# Patient Record
Sex: FEMALE | Race: WHITE | NOT HISPANIC OR LATINO
[De-identification: names, ages, dates, MRNs, and addresses within clinical notes are randomized per-mention and may not be internally consistent; named-entity substitution may affect disease eponyms.]

---

## 2019-04-17 PROBLEM — Z00.00 ENCOUNTER FOR PREVENTIVE HEALTH EXAMINATION: Status: ACTIVE | Noted: 2019-04-17

## 2020-01-23 ENCOUNTER — APPOINTMENT (OUTPATIENT)
Dept: NEUROLOGY | Facility: CLINIC | Age: 80
End: 2020-01-23
Payer: MEDICARE

## 2020-01-23 VITALS
BODY MASS INDEX: 23.55 KG/M2 | HEART RATE: 88 BPM | OXYGEN SATURATION: 98 % | SYSTOLIC BLOOD PRESSURE: 150 MMHG | DIASTOLIC BLOOD PRESSURE: 83 MMHG | TEMPERATURE: 97.7 F | HEIGHT: 62 IN | WEIGHT: 128 LBS

## 2020-01-23 PROCEDURE — 99213 OFFICE O/P EST LOW 20 MIN: CPT

## 2020-01-23 NOTE — PHYSICAL EXAM
[FreeTextEntry1] : Patient has fluent speech. Follows commands.  Looks to  frequently for answers.Impaired short term recall. Symmetric facial expression. Ambulates w/o walking aid.\par

## 2020-01-23 NOTE — ASSESSMENT
[FreeTextEntry1] : Alzheimer disease, moderate sseverity. Still independent in ADL's though requires some prompting from her spouse for dressing.\par \par 1.  MRI brain w/wo contrast due to hx of breast cancer.\par 2.  Donepezil 5 mg po daily with breakfast.\par 3.  Continue memantine 5 mg po bid.\par 4.  Return in 6 months.\par 5.  Encouraged Alzheimer support group.\par 6.  Physical activity and socialization encouraged.

## 2020-01-23 NOTE — HISTORY OF PRESENT ILLNESS
[FreeTextEntry1] : Pt is 80 yo female with dementia, likely Alzheimer's.   states she is slow to respond to get dressed.  Repeats questions more frequently.  Asked  every half hour what they were going to have for dinner.  \par \par  checked on brain scan and states she never had one before.  She had chest, abdomen and pelvis.  Had breast cancer in past and mastectomy.  \par \par SHe does not drive.\par Still cooks.  Discussed home safety.

## 2020-05-11 ENCOUNTER — RX RENEWAL (OUTPATIENT)
Age: 80
End: 2020-05-11

## 2020-07-28 RX ORDER — DONEPEZIL HYDROCHLORIDE 5 MG/1
5 TABLET ORAL
Qty: 90 | Refills: 3 | Status: ACTIVE | COMMUNITY
Start: 2020-01-23

## 2020-07-30 ENCOUNTER — APPOINTMENT (OUTPATIENT)
Dept: NEUROLOGY | Facility: CLINIC | Age: 80
End: 2020-07-30

## 2020-10-06 ENCOUNTER — APPOINTMENT (OUTPATIENT)
Dept: NEUROLOGY | Facility: CLINIC | Age: 80
End: 2020-10-06
Payer: MEDICARE

## 2020-10-06 VITALS
WEIGHT: 128 LBS | OXYGEN SATURATION: 97 % | TEMPERATURE: 97 F | HEIGHT: 62 IN | DIASTOLIC BLOOD PRESSURE: 84 MMHG | SYSTOLIC BLOOD PRESSURE: 150 MMHG | BODY MASS INDEX: 23.55 KG/M2 | HEART RATE: 73 BPM

## 2020-10-06 DIAGNOSIS — F03.90 UNSPECIFIED DEMENTIA W/OUT BEHAVIORAL DISTURBANCE: ICD-10-CM

## 2020-10-06 PROCEDURE — 99214 OFFICE O/P EST MOD 30 MIN: CPT

## 2020-10-06 RX ORDER — MEMANTINE HYDROCHLORIDE 5 MG/1
5 TABLET, FILM COATED ORAL
Qty: 180 | Refills: 3 | Status: ACTIVE | COMMUNITY
Start: 2020-01-23 | End: 1900-01-01

## 2020-10-06 NOTE — ASSESSMENT
[FreeTextEntry1] : 1.  Dementia, most likely Alzheimer type, progressed.  She is on maximal medical therapy since unlikely to tolerate higher donepezil dose due to some loose stools on the 5 mg/day dose. Cardiologist apparently did not want her on higher memantine dose than 5 mg bid. \par 2.  BP is mildly elevated today though pt admits to being anxious in doctor's office.\par 3.  Check B12/folate\par 4.  Return in 6 months.\par

## 2020-10-06 NOTE — HISTORY OF PRESENT ILLNESS
[FreeTextEntry1] : Pt presents for f/u of AD.  First 56 years of marriage no problems, no yelling.\par Now short term memory gone, depression and uptight and worries and yells at him. \par Long term memory is pretty good.\par \par Struggles with cooking, hard to remember how she did certain recipes.  \par No safety concerns.  \par  says she doesn't hit him.\par \par She remains independent in her ADL's.\par \par She is able to sleep through the night and doesn't get up most of the time before 11:30 or 12 oclock.\par \par  states she was tested for sleep apnea as was he and both have it.  She wasn't able to cope with the mask.

## 2021-04-05 ENCOUNTER — APPOINTMENT (OUTPATIENT)
Dept: NEUROLOGY | Facility: CLINIC | Age: 81
End: 2021-04-05

## 2021-09-13 NOTE — PHYSICAL EXAM
[FreeTextEntry1] : Able to name president but apparently not aware of his recent Covid infection.\par able to tell me month and year.\par Paucity of speech, asks examiner to repeat himself frequently.\par Has some apraxia for brushing hair.\par strength 5/5 upper and lower extremities.\par ambulates w/o difficulty.\par LT, PP inact. Some mild decrement in vibration sense in LE's.\par  decrease appetite/dehydration

## 2021-12-14 ENCOUNTER — APPOINTMENT (OUTPATIENT)
Dept: NEUROLOGY | Facility: CLINIC | Age: 81
End: 2021-12-14

## 2022-07-08 ENCOUNTER — INPATIENT (INPATIENT)
Facility: HOSPITAL | Age: 82
LOS: 4 days | Discharge: NURSING, REHAB OR LONG-TERM CARE | End: 2022-07-13
Attending: INTERNAL MEDICINE | Admitting: INTERNAL MEDICINE

## 2022-07-08 VITALS
HEIGHT: 62 IN | HEART RATE: 50 BPM | WEIGHT: 115.08 LBS | OXYGEN SATURATION: 100 % | DIASTOLIC BLOOD PRESSURE: 78 MMHG | TEMPERATURE: 98 F | SYSTOLIC BLOOD PRESSURE: 123 MMHG | RESPIRATION RATE: 17 BRPM

## 2022-07-08 DIAGNOSIS — Z90.12 ACQUIRED ABSENCE OF LEFT BREAST AND NIPPLE: Chronic | ICD-10-CM

## 2022-07-08 DIAGNOSIS — Z90.710 ACQUIRED ABSENCE OF BOTH CERVIX AND UTERUS: Chronic | ICD-10-CM

## 2022-07-08 LAB
ALBUMIN SERPL ELPH-MCNC: 4.5 G/DL — SIGNIFICANT CHANGE UP (ref 3.5–5.2)
ALP SERPL-CCNC: 70 U/L — SIGNIFICANT CHANGE UP (ref 30–115)
ALT FLD-CCNC: 11 U/L — SIGNIFICANT CHANGE UP (ref 0–41)
ANION GAP SERPL CALC-SCNC: 14 MMOL/L — SIGNIFICANT CHANGE UP (ref 7–14)
APPEARANCE UR: CLEAR — SIGNIFICANT CHANGE UP
APTT BLD: 29.1 SEC — SIGNIFICANT CHANGE UP (ref 27–39.2)
AST SERPL-CCNC: 22 U/L — SIGNIFICANT CHANGE UP (ref 0–41)
BASOPHILS # BLD AUTO: 0.03 K/UL — SIGNIFICANT CHANGE UP (ref 0–0.2)
BASOPHILS NFR BLD AUTO: 0.2 % — SIGNIFICANT CHANGE UP (ref 0–1)
BILIRUB SERPL-MCNC: 0.7 MG/DL — SIGNIFICANT CHANGE UP (ref 0.2–1.2)
BILIRUB UR-MCNC: NEGATIVE — SIGNIFICANT CHANGE UP
BLD GP AB SCN SERPL QL: SIGNIFICANT CHANGE UP
BUN SERPL-MCNC: 18 MG/DL — SIGNIFICANT CHANGE UP (ref 10–20)
CALCIUM SERPL-MCNC: 10.2 MG/DL — HIGH (ref 8.5–10.1)
CHLORIDE SERPL-SCNC: 104 MMOL/L — SIGNIFICANT CHANGE UP (ref 98–110)
CK SERPL-CCNC: 41 U/L — SIGNIFICANT CHANGE UP (ref 0–225)
CO2 SERPL-SCNC: 25 MMOL/L — SIGNIFICANT CHANGE UP (ref 17–32)
COLOR SPEC: YELLOW — SIGNIFICANT CHANGE UP
CREAT SERPL-MCNC: 1.1 MG/DL — SIGNIFICANT CHANGE UP (ref 0.7–1.5)
DIFF PNL FLD: NEGATIVE — SIGNIFICANT CHANGE UP
EGFR: 50 ML/MIN/1.73M2 — LOW
EOSINOPHIL # BLD AUTO: 0.06 K/UL — SIGNIFICANT CHANGE UP (ref 0–0.7)
EOSINOPHIL NFR BLD AUTO: 0.5 % — SIGNIFICANT CHANGE UP (ref 0–8)
ETHANOL SERPL-MCNC: <10 MG/DL — SIGNIFICANT CHANGE UP
GLUCOSE SERPL-MCNC: 156 MG/DL — HIGH (ref 70–99)
GLUCOSE UR QL: NEGATIVE — SIGNIFICANT CHANGE UP
HCT VFR BLD CALC: 38.2 % — SIGNIFICANT CHANGE UP (ref 37–47)
HGB BLD-MCNC: 13.4 G/DL — SIGNIFICANT CHANGE UP (ref 12–16)
IMM GRANULOCYTES NFR BLD AUTO: 0.6 % — HIGH (ref 0.1–0.3)
INR BLD: 1.06 RATIO — SIGNIFICANT CHANGE UP (ref 0.65–1.3)
KETONES UR-MCNC: NEGATIVE — SIGNIFICANT CHANGE UP
LACTATE SERPL-SCNC: 4 MMOL/L — CRITICAL HIGH (ref 0.7–2)
LEUKOCYTE ESTERASE UR-ACNC: NEGATIVE — SIGNIFICANT CHANGE UP
LIDOCAIN IGE QN: 39 U/L — SIGNIFICANT CHANGE UP (ref 7–60)
LYMPHOCYTES # BLD AUTO: 1.22 K/UL — SIGNIFICANT CHANGE UP (ref 1.2–3.4)
LYMPHOCYTES # BLD AUTO: 9.4 % — LOW (ref 20.5–51.1)
MCHC RBC-ENTMCNC: 31.2 PG — HIGH (ref 27–31)
MCHC RBC-ENTMCNC: 35.1 G/DL — SIGNIFICANT CHANGE UP (ref 32–37)
MCV RBC AUTO: 88.8 FL — SIGNIFICANT CHANGE UP (ref 81–99)
MONOCYTES # BLD AUTO: 0.59 K/UL — SIGNIFICANT CHANGE UP (ref 0.1–0.6)
MONOCYTES NFR BLD AUTO: 4.6 % — SIGNIFICANT CHANGE UP (ref 1.7–9.3)
NEUTROPHILS # BLD AUTO: 10.96 K/UL — HIGH (ref 1.4–6.5)
NEUTROPHILS NFR BLD AUTO: 84.7 % — HIGH (ref 42.2–75.2)
NITRITE UR-MCNC: NEGATIVE — SIGNIFICANT CHANGE UP
NRBC # BLD: 0 /100 WBCS — SIGNIFICANT CHANGE UP (ref 0–0)
PH UR: 7 — SIGNIFICANT CHANGE UP (ref 5–8)
PLATELET # BLD AUTO: 141 K/UL — SIGNIFICANT CHANGE UP (ref 130–400)
POTASSIUM SERPL-MCNC: 4.5 MMOL/L — SIGNIFICANT CHANGE UP (ref 3.5–5)
POTASSIUM SERPL-SCNC: 4.5 MMOL/L — SIGNIFICANT CHANGE UP (ref 3.5–5)
PROT SERPL-MCNC: 6.6 G/DL — SIGNIFICANT CHANGE UP (ref 6–8)
PROT UR-MCNC: NEGATIVE — SIGNIFICANT CHANGE UP
PROTHROM AB SERPL-ACNC: 12.2 SEC — SIGNIFICANT CHANGE UP (ref 9.95–12.87)
RBC # BLD: 4.3 M/UL — SIGNIFICANT CHANGE UP (ref 4.2–5.4)
RBC # FLD: 13.3 % — SIGNIFICANT CHANGE UP (ref 11.5–14.5)
SARS-COV-2 RNA SPEC QL NAA+PROBE: SIGNIFICANT CHANGE UP
SODIUM SERPL-SCNC: 143 MMOL/L — SIGNIFICANT CHANGE UP (ref 135–146)
SP GR SPEC: 1.01 — SIGNIFICANT CHANGE UP (ref 1.01–1.03)
TROPONIN T SERPL-MCNC: <0.01 NG/ML — SIGNIFICANT CHANGE UP
UROBILINOGEN FLD QL: SIGNIFICANT CHANGE UP
WBC # BLD: 12.94 K/UL — HIGH (ref 4.8–10.8)
WBC # FLD AUTO: 12.94 K/UL — HIGH (ref 4.8–10.8)

## 2022-07-08 PROCEDURE — 71045 X-RAY EXAM CHEST 1 VIEW: CPT | Mod: 26

## 2022-07-08 PROCEDURE — 99223 1ST HOSP IP/OBS HIGH 75: CPT

## 2022-07-08 PROCEDURE — 93010 ELECTROCARDIOGRAM REPORT: CPT | Mod: 76

## 2022-07-08 PROCEDURE — 72125 CT NECK SPINE W/O DYE: CPT | Mod: 26,MA

## 2022-07-08 PROCEDURE — 64447 NJX AA&/STRD FEMORAL NRV IMG: CPT | Mod: GC

## 2022-07-08 PROCEDURE — 73502 X-RAY EXAM HIP UNI 2-3 VIEWS: CPT | Mod: 26,RT

## 2022-07-08 PROCEDURE — 93308 TTE F-UP OR LMTD: CPT | Mod: 26,GC

## 2022-07-08 PROCEDURE — 99283 EMERGENCY DEPT VISIT LOW MDM: CPT

## 2022-07-08 PROCEDURE — 76705 ECHO EXAM OF ABDOMEN: CPT | Mod: 26,GC

## 2022-07-08 PROCEDURE — 71260 CT THORAX DX C+: CPT | Mod: 26,MA

## 2022-07-08 PROCEDURE — 76604 US EXAM CHEST: CPT | Mod: 26,GC

## 2022-07-08 PROCEDURE — 73700 CT LOWER EXTREMITY W/O DYE: CPT | Mod: 26,RT,MA

## 2022-07-08 PROCEDURE — 76942 ECHO GUIDE FOR BIOPSY: CPT | Mod: 26,GC,59

## 2022-07-08 PROCEDURE — 74177 CT ABD & PELVIS W/CONTRAST: CPT | Mod: 26,MA

## 2022-07-08 PROCEDURE — 99285 EMERGENCY DEPT VISIT HI MDM: CPT | Mod: 25,GC

## 2022-07-08 PROCEDURE — 70450 CT HEAD/BRAIN W/O DYE: CPT | Mod: 26,MA

## 2022-07-08 RX ORDER — SODIUM CHLORIDE 9 MG/ML
1000 INJECTION, SOLUTION INTRAVENOUS
Refills: 0 | Status: DISCONTINUED | OUTPATIENT
Start: 2022-07-08 | End: 2022-07-09

## 2022-07-08 RX ORDER — SODIUM CHLORIDE 9 MG/ML
1000 INJECTION, SOLUTION INTRAVENOUS ONCE
Refills: 0 | Status: COMPLETED | OUTPATIENT
Start: 2022-07-08 | End: 2022-07-08

## 2022-07-08 RX ORDER — PANTOPRAZOLE SODIUM 20 MG/1
40 TABLET, DELAYED RELEASE ORAL
Refills: 0 | Status: DISCONTINUED | OUTPATIENT
Start: 2022-07-08 | End: 2022-07-09

## 2022-07-08 RX ORDER — ENOXAPARIN SODIUM 100 MG/ML
40 INJECTION SUBCUTANEOUS EVERY 24 HOURS
Refills: 0 | Status: DISCONTINUED | OUTPATIENT
Start: 2022-07-08 | End: 2022-07-09

## 2022-07-08 RX ADMIN — SODIUM CHLORIDE 1000 MILLILITER(S): 9 INJECTION, SOLUTION INTRAVENOUS at 12:00

## 2022-07-08 RX ADMIN — ENOXAPARIN SODIUM 40 MILLIGRAM(S): 100 INJECTION SUBCUTANEOUS at 18:00

## 2022-07-08 RX ADMIN — SODIUM CHLORIDE 75 MILLILITER(S): 9 INJECTION, SOLUTION INTRAVENOUS at 18:00

## 2022-07-08 RX ADMIN — SODIUM CHLORIDE 1000 MILLILITER(S): 9 INJECTION, SOLUTION INTRAVENOUS at 10:45

## 2022-07-08 NOTE — ED PROVIDER NOTE - CLINICAL SUMMARY MEDICAL DECISION MAKING FREE TEXT BOX
82 y F BIBEMS, found down, ams w/ stool and urine on BLE. Pt was found down outside, RLE shortened and ER. EKG, labs and imaging reviewed.  Patient with a right-sided intertrochanteric hip fracture.  Fascia iliac a block was performed, orthopedics evaluated the patient and plan to admit to medicine.  Pain controlled.

## 2022-07-08 NOTE — ED PROCEDURE NOTE - NS ED PROC PERFORMED BY1 FT
[facility]

## 2022-07-08 NOTE — H&P ADULT - NSCORESITESY/N_GEN_A_CORE_RD
No
Constitutional: in no apparent distress, speaking in full sentences  HEENT: neck supple, FROM, tongue is pink, moist and midline  EYES: non-injected, non-icteric, PERRL, EOMI  RESPIRATORY: lungs clear  CARDIAC: S1 S2, regular rate, moving chest wall symmetrically, DP pulse intact  GI: bowel sounds present, abdomen soft, non-tender  : no CVA tenderness  MSK: spine is midline, no calf tenderness, no knee pain, knee joint intact without laxity, tenderness to medial and lateral malleolus, most swelling to lateral malleolus, no tenderness over proximal metatarsal  SKIN: no jaundice, well healed L temporal scar  NEURO: awake and alert

## 2022-07-08 NOTE — CONSULT NOTE ADULT - ASSESSMENT
ASSESSMENT:  This is a 82y Female with PMH as above, presenting as a Trauma Alert, GCS 14, AAOx2, BOB, with complaint of right thigh pain , external signs of trauma include right hip tenderness.    Injuries:  -   -   -     PLAN:   Trauma Labs: CBC, CMP, PT/PTT/INR, EtOH  Trauma Imaging to include the following:  - CXR  - PXR  - CTH    - CT C-spine  - CT Chest  - CT Ab/Pelvis  - Extremity films: Right hip/femur    Additional studies:  EKG  Utox  UA    Additional consultations: ***pending  Neurosurgery  Orthopedics  OMFS  PT/Rehab/  Hospitalist/Medicine     Disposition pending results of above labs and imaging  Above plan discussed with Trauma attending,  ***  , patient, patient family, and ED team  --------------------------------------------------------------------------------------  07-08-22 @ 11:06 ASSESSMENT:  This is a 82y Female with PMH as above, presenting as a Trauma Alert, GCS 14, AAOx2, BOB, with complaint of right thigh pain , external signs of trauma include right hip tenderness.    Injuries:  - Acute Comminuted Intertrochanteric fracture of right hip      PLAN:   Trauma Labs: CBC, CMP, PT/PTT/INR, EtOH  Trauma Imaging to include the following:  - CXR  - PXR  - CTH    - CT C-spine  - CT Chest  - CT Ab/Pelvis  - Extremity films: Right hip/femur    Additional studies:  EKG  Utox  UA    Additional consultations:  Orthopedics  PT/Rehab/SW  Hospitalist/Medicine     Recommend admission to medicine given isolated hip fracture and plan for orthopedic intervention. Trauma Surgery will follow for tertiary survey on 7/9  Above plan discussed with Trauma attending, Dr. Boogie , patient, patient family, and ED team  --------------------------------------------------------------------------------------  07-08-22 @ 11:06 Pt aggressive and impatient despite explanation

## 2022-07-08 NOTE — H&P ADULT - ASSESSMENT
Patient is an 82-yo female with a pmh of dementia (AAO x1 baseline), GERD, breast cancer s/p mastectomy in remission, who was bibems after being found down altered with right hip pain. Patient scheduled for repair of hip fracture with Ortho Saturday, being admitted to medicine for management.    # R' Hip fracture  - scheduled for OR Saturday, 7/9/22  - ortho recs appr: bedrest, mechanical ppx, cardiac risk assessment by Dr. Banks (follows pt)  - f/u CBC/BMP, coags    #AMS on admission  # Syncope vs. Seizure vs. Mechanical Fall  # hx of dementia  - trauma imaging showed no acute intracranial pathology  - wbc 12.94 w/ lactate 4.0 on presentation s/p bolus in ED  - currently vitally stable  - gentle hydration  - f/u orthostatics, echo, trend lactate Patient is an 82-yo female with a pmh of dementia (AAO x1 baseline), GERD, breast cancer s/p mastectomy in remission, who was bibems after being found down altered with right hip pain. Patient scheduled for repair of hip fracture with Ortho Saturday, being admitted to medicine for management.    # R' Hip fracture  - scheduled for OR Saturday, 7/9/22  - ortho recs appr: bedrest, mechanical ppx, cardiac risk assessment by Dr. Banks (follows pt)  - f/u CBC/BMP, coags    #AMS on admission  # Syncope vs. Seizure vs. Mechanical Fall  # hx of dementia  - trauma imaging showed no acute intracranial pathology  - wbc 12.94 w/ lactate 4.0 on presentation s/p bolus in ED  - currently vitally stable  - gentle hydration  - f/u orthostatics, echo, trend lactate  - c/w with home medications: donepezil    # GERD  - Protonix 40 QD    Misc:  - DVT ppx: Lovenox   - GI ppx: Protonix  - Diet: NPO am  - Dispo: Acute, admit to medicine

## 2022-07-08 NOTE — ED PROCEDURE NOTE - PROCEDURE NAME, MLM
Point of Care Ultrasound FAST
Point of Care Ultrasound Guided Regional Nerve Block
Point of Care Ultrasound Pneumothorax

## 2022-07-08 NOTE — ED PROVIDER NOTE - OBJECTIVE STATEMENT
82 y F BIBEMS, found down, ams w/ stool and urine on BLE. Pt was found down outside, EMS was called and picked pt up; entire ride here pt was vitally stable, no meds given. 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 no reported allergies. pt was last seen by  ~9am; he went to the bathroom and when he came out, pt was not in the bed.  searched the home but did not find pt. denies recent illness/n/v/f/sob/cp/back pain.

## 2022-07-08 NOTE — ED PROVIDER NOTE - ATTENDING CONTRIBUTION TO CARE
82-year-old female here for evaluation of fall.  Patient poor story and was found outside and EMS called.  Patient reportedly fell and her on her right side.  Here patient no distress shivering intermittently S1-S2 bradycardia clear to auscultation bilaterally soft nontender right leg is shortened tenderness pain to the right hip with palpation.  Full range of motion of the upper extremities no pain with compression of ribs.  Based off the fact the patient is slightly altered with fall trauma alert called with plan for pan scan labs and reeval.

## 2022-07-08 NOTE — H&P ADULT - ATTENDING COMMENTS
Patient is a poor historian due ot her cognitive impairment    # Fall/Syncope r/o arrythmia  # Bradycardia  # Fracture R Hip due ot fall  # Acute pain due ot fracture  # Suspect reactive leukocytosis and hyperglycemia  # Dementia  # GERD    Telemetry monitoring  Fall precaution  FU echo  Cardiology evaluation prior to ORIF  Ortho recommendation noted  Pain control  Monitor labs and vitals

## 2022-07-08 NOTE — CONSULT NOTE ADULT - SUBJECTIVE AND OBJECTIVE BOX
GENERAL SURGERY CONSULT NOTE      TRAUMA SERVICE CONSULT NOTE  --------------------------------------------------------------------------------------------    TRAUMA ACTIVATION LEVEL: Alert    MECHANISM OF INJURY:      [] Blunt	[] MVC	[x] Fall	[] Pedestrian Struck	[] Motorcycle accident         [] Penetrating	[] Gun Shot Wound 		[] Stab Wound    GCS: 14 	E: 4	V: 4	M: 6    Patient is a 82y old  Female who presents after being found down and brought in by EMS. Patient presented covered in feces and is complaining of right thigh pain. She states she lives at [place] with her  but is also confused.     Primary Survey:   A - airway intact  B - bilateral breath sounds and good chest rise  C - initial BP  BP: 123/78 (07-08-22 @ 10:44), HR: 50 (07-08-22 @ 10:44) , palpable pulses in all extremities  D - GCS 14 on arrival  Exposure obtained      General: NAD  HEENT: Normocephalic, atraumatic, EOMI, PEERLA.  Neck: Soft, midline trachea.  Chest: No chest wall tenderness.   Cardiac: S1, S2, afib on monitor  Respiratory: Bilateral breath sounds, clear and equal bilaterally  Abdomen: Soft, non-distended, non-tender, no rebound, no guarding, no masses palpated  Groin: Normal appearing  Ext: palp radial b/l UE, b/l DP palp in Lower Extrem, motor and sensory grossly intact in all 4 extremities  Back: no TTP, no palpable runoff/stepoff/deformity  Rectal: No zay blood, DESIREE with good tone    Patient denies fevers/chills, denies lightheadedness/dizziness, denies SOB/chest pain, denies nausea/vomiting, denies constipation/diarrhea.  ***    ROS: 10-system review is otherwise negative except HPI above.      PAST MEDICAL & SURGICAL HISTORY:    FAMILY HISTORY:    [] Family history not pertinent as reviewed with the patient and family    SOCIAL HISTORY:     ALLERGIES: No Known Allergies      HOME MEDICATIONS:     CURRENT MEDICATIONS  MEDICATIONS (STANDING):   MEDICATIONS (PRN):  --------------------------------------------------------------------------------------------    Vitals:   T(C): 36.9 (07-08-22 @ 10:44), Max: 36.9 (07-08-22 @ 10:44)  HR: 50 (07-08-22 @ 10:44) (50 - 50)  BP: 123/78 (07-08-22 @ 10:44) (123/78 - 123/78)  RR: 17 (07-08-22 @ 10:44) (17 - 17)  SpO2: 100% (07-08-22 @ 10:44) (100% - 100%)  CAPILLARY BLOOD GLUCOSE        CAPILLARY BLOOD GLUCOSE          Height (cm): 157.5 (07-08 @ 10:44)  Weight (kg): 52.2 (07-08 @ 10:44)  BMI (kg/m2): 21 (07-08 @ 10:44)  BSA (m2): 1.51 (07-08 @ 10:44)        --------------------------------------------------------------------------------------------    LABS  CBC (07-08 @ 10:44)                              13.4                           12.94<H>  )----------------(  141        84.7<H>% Neutrophils, 9.4<L>% Lymphocytes, ANC: 10.96<H>                              38.2          Coags (07-08 @ 10:44)  aPTT 29.1 / INR 1.06 / PT 12.20          --------------------------------------------------------------------------------------------    MICROBIOLOGY      --------------------------------------------------------------------------------------------    IMAGING  ***    --------------------------------------------------------------------------------------------     GENERAL SURGERY CONSULT NOTE      TRAUMA SERVICE CONSULT NOTE  --------------------------------------------------------------------------------------------    TRAUMA ACTIVATION LEVEL: Alert    MECHANISM OF INJURY:      [] Blunt	[] MVC	[x] Fall	[] Pedestrian Struck	[] Motorcycle accident         [] Penetrating	[] Gun Shot Wound 		[] Stab Wound    GCS: 14 	E: 4	V: 4	M: 6    Patient is a 82y old  Female who presents after being found down and brought in by EMS. Patient presented covered in feces and is complaining of right thigh pain. She states she lives at [place] with her  but is also confused.     Primary Survey:   A - airway intact  B - bilateral breath sounds and good chest rise  C - initial BP  BP: 123/78 (07-08-22 @ 10:44), HR: 50 (07-08-22 @ 10:44) , palpable pulses in all extremities  D - GCS 14 on arrival  Exposure obtained      General: NAD  HEENT: Normocephalic, atraumatic, EOMI, PEERLA.  Neck: Soft, midline trachea.  Chest: No chest wall tenderness.   Cardiac: S1, S2, afib on monitor  Respiratory: Bilateral breath sounds, clear and equal bilaterally  Abdomen: Soft, non-distended, non-tender, no rebound, no guarding, no masses palpated  Groin: Normal appearing  Ext: palp radial b/l UE, b/l DP palp in Lower Extrem, motor and sensory grossly intact in all 4 extremities  Back: no TTP, no palpable runoff/stepoff/deformity  Rectal: No zay blood, DESIREE with good tone    Patient denies fevers/chills, denies lightheadedness/dizziness, denies SOB/chest pain, denies nausea/vomiting, denies constipation/diarrhea.  ***    ROS: 10-system review is otherwise negative except HPI above.      PAST MEDICAL & SURGICAL HISTORY:    FAMILY HISTORY:    [] Family history not pertinent as reviewed with the patient and family    SOCIAL HISTORY:     ALLERGIES: No Known Allergies      HOME MEDICATIONS:     CURRENT MEDICATIONS  MEDICATIONS (STANDING):   MEDICATIONS (PRN):  --------------------------------------------------------------------------------------------    Vitals:   T(C): 36.9 (07-08-22 @ 10:44), Max: 36.9 (07-08-22 @ 10:44)  HR: 50 (07-08-22 @ 10:44) (50 - 50)  BP: 123/78 (07-08-22 @ 10:44) (123/78 - 123/78)  RR: 17 (07-08-22 @ 10:44) (17 - 17)  SpO2: 100% (07-08-22 @ 10:44) (100% - 100%)  CAPILLARY BLOOD GLUCOSE        CAPILLARY BLOOD GLUCOSE          Height (cm): 157.5 (07-08 @ 10:44)  Weight (kg): 52.2 (07-08 @ 10:44)  BMI (kg/m2): 21 (07-08 @ 10:44)  BSA (m2): 1.51 (07-08 @ 10:44)        --------------------------------------------------------------------------------------------    LABS  CBC (07-08 @ 10:44)                              13.4                           12.94<H>  )----------------(  141        84.7<H>% Neutrophils, 9.4<L>% Lymphocytes, ANC: 10.96<H>                              38.2          Coags (07-08 @ 10:44)  aPTT 29.1 / INR 1.06 / PT 12.20          --------------------------------------------------------------------------------------------    MICROBIOLOGY      --------------------------------------------------------------------------------------------    IMAGING  < from: CT Head No Cont (07.08.22 @ 12:47) >  IMPRESSION:    CT HEAD:  No acute intracranial pathology or hemorrhage. No acute calvarial   fracture.    Mild chronic microvascular type changes.    CT CERVICAL SPINE:  No acute fracture or subluxation.    < end of copied text >  < from: CT Chest w/ IV Cont (07.08.22 @ 12:51) >    IMPRESSION:    Acute comminuted right femur intertrochanteric hip fracture.    No acute traumatic abnormality within the chest abdomen and pelvis.    < end of copied text >  < from: CT Abdomen and Pelvis w/ IV Cont (07.08.22 @ 12:51) >  IMPRESSION:    Acute comminuted right femur intertrochanteric hip fracture.    No acute traumatic abnormality within the chest abdomen and pelvis.    < end of copied text >      --------------------------------------------------------------------------------------------

## 2022-07-08 NOTE — H&P ADULT - NSHPLABSRESULTS_GEN_ALL_CORE
13.4   12.94 )-----------( 141      ( 2022 10:44 )             38.2       -    143  |  104  |  18  ----------------------------<  156<H>  4.5   |  25  |  1.1    Ca    10.2<H>      2022 10:44    TPro  6.6  /  Alb  4.5  /  TBili  0.7  /  DBili  x   /  AST  22  /  ALT  11  /  AlkPhos  70  07-08              Urinalysis Basic - ( 2022 11:50 )    Color: Yellow / Appearance: Clear / S.011 / pH: x  Gluc: x / Ketone: Negative  / Bili: Negative / Urobili: <2 mg/dL   Blood: x / Protein: Negative / Nitrite: Negative   Leuk Esterase: Negative / RBC: x / WBC x   Sq Epi: x / Non Sq Epi: x / Bacteria: x        PT/INR - ( 2022 10:44 )   PT: 12.20 sec;   INR: 1.06 ratio         PTT - ( 2022 10:44 )  PTT:29.1 sec    Lactate Trend   @ 10:44 Lactate:4.0       CARDIAC MARKERS ( 2022 10:44 )  x     / <0.01 ng/mL / 41 U/L / x     / x            CAPILLARY BLOOD GLUCOSE 13.4   12.94 )-----------( 141      ( 2022 10:44 )             38.2       -    143  |  104  |  18  ----------------------------<  156<H>  4.5   |  25  |  1.1    Ca    10.2<H>      2022 10:44    TPro  6.6  /  Alb  4.5  /  TBili  0.7  /  DBili  x   /  AST  22  /  ALT  11  /  AlkPhos  70  07-              Urinalysis Basic - ( 2022 11:50 )    Color: Yellow / Appearance: Clear / S.011 / pH: x  Gluc: x / Ketone: Negative  / Bili: Negative / Urobili: <2 mg/dL   Blood: x / Protein: Negative / Nitrite: Negative   Leuk Esterase: Negative / RBC: x / WBC x   Sq Epi: x / Non Sq Epi: x / Bacteria: x        PT/INR - ( 2022 10:44 )   PT: 12.20 sec;   INR: 1.06 ratio         PTT - ( 2022 10:44 )  PTT:29.1 sec    Lactate Trend   @ 10:44 Lactate:4.0       CARDIAC MARKERS ( 2022 10:44 )  x     / <0.01 ng/mL / 41 U/L / x     / x            CAPILLARY BLOOD GLUCOSE    Imaging:    < from: Xray Hip 2-3 Views, Right (22 @ 12:25) >    XR HIP 2-3V RT                          PROCEDURE DATE:  2022      impression:    The patient has a comminuted intertrochanteric fracture of the right hip   with varus angulation.

## 2022-07-08 NOTE — H&P ADULT - NSHPPHYSICALEXAM_GEN_ALL_CORE
PHYSICAL EXAM:  GENERAL: NAD, speaks in full sentences, no signs of respiratory distress  HEAD:  Atraumatic, Normocephalic  EYES: EOMI, PERRLA, anicteric sclera  NECK: Supple, No JVD  CHEST/LUNG: Clear to auscultation bilaterally; No wheeze; No crackles; No accessory muscles used  HEART: Regular rate and rhythm; No murmurs;   ABDOMEN: Soft, Nontender, Nondistended; Bowel sounds present; No guarding  EXTREMITIES:  2+ Peripheral Pulses, No cyanosis or edema  PSYCH: AAOx3  NEUROLOGY: non-focal  SKIN: No rashes or lesions PHYSICAL EXAM:  GENERAL: NAD, resting comfortably, AAO x1  HEAD:  Atraumatic, Normocephalic  EYES: EOMI, PERRLA, anicteric sclera  NECK: Supple, No JVD  CHEST/LUNG: Clear to auscultation bilaterally; No wheeze; No crackles; No accessory muscles used  HEART: Regular rate and rhythm; No murmurs  ABDOMEN: Soft, Nontender, Nondistended; Bowel sounds present; No guarding  EXTREMITIES:  2+ Peripheral Pulses, No cyanosis or edema, numb R' LE 2/2 nerve block, neurovascularly intact  NEUROLOGY: CN 2-12 intact, hearing loss (uses hearing aid), non-focal  SKIN: No rashes or lesions ICU Vital Signs Last 24 Hrs  T(C): 36.9 (08 Jul 2022 10:44), Max: 36.9 (08 Jul 2022 10:44)  T(F): 98.4 (08 Jul 2022 10:44), Max: 98.4 (08 Jul 2022 10:44)  HR: 50 (08 Jul 2022 10:44) (50 - 50)  BP: 123/78 (08 Jul 2022 10:44) (123/78 - 123/78)  RR: 17 (08 Jul 2022 10:44) (17 - 17)  SpO2: 100% (08 Jul 2022 10:44) (100% - 100%)    O2 Parameters below as of 08 Jul 2022 10:44  Patient On (Oxygen Delivery Method): room air            PHYSICAL EXAM:  GENERAL: NAD, resting comfortably, AAO x1  HEAD:  Atraumatic, Normocephalic  EYES: EOMI, PERRLA, anicteric sclera  NECK: Supple, No JVD  CHEST/LUNG: Clear to auscultation bilaterally; No wheeze; No crackles; No accessory muscles used  HEART: Regular rate and rhythm; No murmurs  ABDOMEN: Soft, Nontender, Nondistended; Bowel sounds present; No guarding  EXTREMITIES:  2+ Peripheral Pulses, No cyanosis or edema, numb R' LE 2/2 nerve block, neurovascularly intact  NEUROLOGY: CN 2-12 intact, hearing loss (uses hearing aid), non-focal  SKIN: No rashes or lesions

## 2022-07-08 NOTE — CONSULT NOTE ADULT - SUBJECTIVE AND OBJECTIVE BOX
INTERTROCHANTERIC HIP FRACTURE CONSULT  T(C): 36.9 (07-08-22 @ 10:44), Max: 36.9 (07-08-22 @ 10:44)  HR: 50 (07-08-22 @ 10:44) (50 - 50)  BP: 123/78 (07-08-22 @ 10:44) (123/78 - 123/78)  RR: 17 (07-08-22 @ 10:44) (17 - 17)  SpO2: 100% (07-08-22 @ 10:44) (100% - 100%)    ^ALTERED    MEWS Score    ALTERED    SysAdmin_VisitLink                            13.4   12.94 )-----------( 141      ( 08 Jul 2022 10:44 )             38.2     07-08    143  |  104  |  18  ----------------------------<  156<H>  4.5   |  25  |  1.1    Ca    10.2<H>      08 Jul 2022 10:44    TPro  6.6  /  Alb  4.5  /  TBili  0.7  /  DBili  x   /  AST  22  /  ALT  11  /  AlkPhos  70  07-08    PT/INR - ( 08 Jul 2022 10:44 )   PT: 12.20 sec;   INR: 1.06 ratio         PTT - ( 08 Jul 2022 10:44 )  PTT:29.1 sec    No Known Allergies    82y    Pt. has documented dementia, found down outside her home c/o pain  to the right hip with an inability to ambulate after the fall.   Prior to the fall the Pt was  a houshold ambulator  without an assistive device.   Xrays reveal a displaced  Intertrochanteric Hip fracture.w/ subtroch extension   Risks, benefits and alternatives have been discussed with the   Pt./ family  are in agreement with surgical intervention.    Physical exam :  The right          lower extremity is shortened, externally rotated and adducted .  Severe pain on motion.  N/V/I  The remainder of the musculoskeletal exam fails to reveal any acute deformity , or new areas of pain or sts.    Assessment        right     Intertrochanteric hip fracture  Plan:  Bed rest  Continue mechanical DVT prophylaxis  / heparin for DVT prophylaxis, cardiac risk assessment  by Dr Banks has been requested , we spoke to him today at noon he will see the pt today he has seen her in the office recently , rpt hgb, 2 units prbc on hold for surgery.  plan is for surgery ORIF  Sat afternoon

## 2022-07-08 NOTE — CONSULT NOTE ADULT - SUBJECTIVE AND OBJECTIVE BOX
Patient is a 82y old  Female who presents with a chief complaint of Medical Management prior to hip fracture repair (2022 14:33)      HPI:  Known patient to me had a fall, non witnessed, sustained a right hip Fx and needs surgery.  She is in the bed supine in o distress, she is known for the dementia,  and daughter at the bedside.  Known for the mild CKD, GERD, high lipid, dementia and mild moderate aortic valve regurgitation  In the last 1 year lost significant weight her BP and lipid panel dropped to a normal range    HOME MED: Ramipril 10, Simvastatin 10, Lansoprazole 15, Hydroxyzine, Escitalopram, Calcium, Omega 3     Patient is an 82-year old female with a past medical history of dementia (AAO x1 baseline), "leaky valve", GERD, breast cancer s/p resection in remission, rheumatic fever as a child (as per , not positive), who was bibems after being found down altered with stool and urine on b/l LE. According to , patient left the house around 8 unbeknownst to him. Around 9 he realized patient was missing, at which point he received a call from the ED. This is the first time she has left the home alone without notifying anyone. On ride to ED, patient remianed vitally stable, not given any meds. Denies any recent fever, chills, n/v, c/d, chest pains, sob, recent illness or sick contacts. Came in complaining of right hip pain.    In ED, patient received trauma work-up given her altered mental status. Vitals on presentation were /78, HR 50, RR 17, T36.9, SPO2 100% on RA. Hip xray revealed a comminuted intertrochanteric fracture of the right hip. Trauma imaging was otherwise negative for any acute pathology. Labs were significant for wbc 12.94, Ca 10.2, lactate 4.0. UA negative and blood alcohol level was wnl.    Patient was seen by Orthopedics and is scheduled for surgical repair tomorrow, 22. Is being admitted to medicine for management.    Cardiologist: Dr. Banks  Neurologist: Dr. Millan     (2022 14:33)      PAST MEDICAL & SURGICAL HISTORY:  Dementia      GERD (gastroesophageal reflux disease)      Breast cancer      Leaky heart valve      S/P hysterectomy      S/P left mastectomy          PREVIOUS DIAGNOSTIC TESTING:      ECHO  FINDINGS: in the office: EF 50-55%, mild grade 1 diastolic dysfunction, PA pressure 27 mmHg, mild moderate AI, trace TR    STRESS TEST  FINDINGS:    CATHETERIZATION  FINDINGS:    MEDICATIONS  (STANDING):  enoxaparin Injectable 40 milliGRAM(s) SubCutaneous every 24 hours  lactated ringers. 1000 milliLiter(s) (75 mL/Hr) IV Continuous <Continuous>  pantoprazole    Tablet 40 milliGRAM(s) Oral before breakfast    MEDICATIONS  (PRN):      FAMILY HISTORY:  Family history of colon cancer in mother        SOCIAL HISTORY:  CIGARETTES: no  ALCOHOL: no  DRUGS: no                      REVIEW OF SYSTEMS:  CONSTITUTIONAL: No distress, no distress or pain  NECK: No pain   RESPIRATORY: No shortness of breath  CARDIOVASCULAR: No chest pain, SOB, palpitations  GASTROINTESTINAL: No abdominal or epigastric pain. No nausea, vomiting  NEUROLOGICAL: No headaches  SKIN: No itching   MUSCULOSKELETAL: By palpation right hip pain          Vital Signs Last 24 Hrs  T(C): 36.9 (2022 10:44), Max: 36.9 (2022 10:44)  T(F): 98.4 (2022 10:44), Max: 98.4 (2022 10:44)  HR: 50 (2022 10:44) (50 - 50)  BP: 123/78 (2022 10:44) (123/78 - 123/78)  BP(mean): --  RR: 17 (2022 10:44) (17 - 17)  SpO2: 100% (2022 10:44) (100% - 100%)    Parameters below as of 2022 10:44  Patient On (Oxygen Delivery Method): room air                          PHYSICAL EXAM:  GENERAL: No distress, slender geriatric patient in supine in no respiratory distress  HEAD:  Atraumatic, Normocephalic  NECK: Supple, No JVD, No Bruit of either carotid arteries  NERVOUS SYSTEM:  Alert, Awake, not Oriented, severe dementia, memory loss  CHEST/LUNG: Normal air entry to lung base bilaterally; No wheeze, crackle, rales, rhonchi  HEART: Regular heart beat, S1, A2, P2, No S3, No gallop, 2/4 diastolic aortic murmur at the left sternal border  ABDOMEN: Soft, Non tender, Non distended; Bowel sounds present  EXTREMITIES:  2+ Peripheral Pulses, No edema  SKIN: No rashes or lesions    ECG: < from: 12 Lead ECG (22 @ 11:08) >  Sinus bradycardia  Otherwise normal ECG    < end of copied text >      I&O's Detail      LABS:                        13.4   12.94 )-----------( 141      ( 2022 10:44 )             38.2     07-    143  |  104  |  18  ----------------------------<  156<H>  4.5   |  25  |  1.1    Ca    10.2<H>      2022 10:44    TPro  6.6  /  Alb  4.5  /  TBili  0.7  /  DBili  x   /  AST  22  /  ALT  11  /  AlkPhos  70  07-08    CARDIAC MARKERS ( 2022 10:44 )  x     / <0.01 ng/mL / 41 U/L / x     / x          PT/INR - ( 2022 10:44 )   PT: 12.20 sec;   INR: 1.06 ratio         PTT - ( 2022 10:44 )  PTT:29.1 sec  Urinalysis Basic - ( 2022 11:50 )    Color: Yellow / Appearance: Clear / S.011 / pH: x  Gluc: x / Ketone: Negative  / Bili: Negative / Urobili: <2 mg/dL   Blood: x / Protein: Negative / Nitrite: Negative   Leuk Esterase: Negative / RBC: x / WBC x   Sq Epi: x / Non Sq Epi: x / Bacteria: x      I&O's Summary      RADIOLOGY & ADDITIONAL STUDIES:< from: Xray Hip 2-3 Views, Right (22 @ 12:25) >  The patient has a comminuted intertrochanteric fracture of the right hip   with varus angulation.    < end of copied text >  < from: Xray Chest 1 View- PORTABLE-Urgent (22 @ 12:25) >  Support devices: Telemetry leads are seen.    Cardiac/mediastinum/hilum: Unremarkable.    Lung parenchyma/Pleura: Within normal limits.    Skeleton/soft tissues: Unremarkable.    Impression:    No radiographic evidence of acute cardiopulmonary disease.    < end of copied text >  < from: CT Abdomen and Pelvis w/ IV Cont (22 @ 12:51) >  Acute comminuted right femur intertrochanteric hip fracture.    No acute traumatic abnormality within the chest abdomen and pelvis.    < end of copied text >

## 2022-07-08 NOTE — CONSULT NOTE ADULT - ASSESSMENT
well stable BP  Mild moderate AI, acceptable ejection fraction in the current echo  fall, hip fx, needs surgery    I spoke to her  and her daughter and both are aware of the condition and agree with the surgery    Moderate risk patient (age) and surgery (general anesthesia) and limited functional capacity, generally at moderate risk  RCRI score 1, class II, estimated risk of MACE in 30 days about 6%,   by accepting the risk she can have the surgery done  if BP surges then restart Ramipril and if not available Lisinopril 10  post op care as per the event  No endocarditis prophylaxis

## 2022-07-08 NOTE — ED PROVIDER NOTE - PROGRESS NOTE DETAILS
s/o Dr.Reyes f/u imaging labs ekg TN - ortho consulted TN - pt sleeping in stretcher; HD stable on monitor;  now bedside; collateral information taken from . see hpi

## 2022-07-08 NOTE — H&P ADULT - NSHPREVIEWOFSYSTEMS_GEN_ALL_CORE

## 2022-07-08 NOTE — ED ADULT TRIAGE NOTE - CHIEF COMPLAINT QUOTE
Pt BIBA s/p found lying on the side of a street by bystander. Pt confused as per EMS initial assessment. Unknown LOC. Unknown baseline mental status. Unknown if pt is taking anticoagulant. Upon ED interview, pt states, "I went out for a walk while it was still dark. I don't know how I fell." Pt c/o pain to right hip. No visible s/sx of injury noted. (+) dried up feces to perineum, back and lower extremities noted on arrival.

## 2022-07-08 NOTE — PRE PROCEDURE NOTE - PRE PROCEDURE EVALUATION
ORTHOPEDIC SURGERY PRE OP NOTE      Diagnosis: right hip fracture     Planned Procedure: orif right hip     Consent: TO BE OBTAINED BY ATTENDING                   Risks/benefits/alternatives were discussed with the patient/family and they wish to proceed with surgery.     pt has dementia consent obtained from the  is on the chart   ANTICIPATED DATE OF PROCEDURE : 7/9/22  SCHEDULED CASE OR ADD-ON CASE: add on       Consent:     Clearances:   [***] Medicine:   [***called dr burnham cardiologist knows the pt well and will see the pt for risk assessment     T(C): 36.9 (07-08-22 @ 10:44), Max: 36.9 (07-08-22 @ 10:44)  HR: 50 (07-08-22 @ 10:44) (50 - 50)  BP: 123/78 (07-08-22 @ 10:44) (123/78 - 123/78)  RR: 17 (07-08-22 @ 10:44) (17 - 17)  SpO2: 100% (07-08-22 @ 10:44) (100% - 100%)    Labs:                        13.4   12.94 )-----------( 141      ( 08 Jul 2022 10:44 )             38.2     07-08    143  |  104  |  18  ----------------------------<  156<H>  4.5   |  25  |  1.1    Ca    10.2<H>      08 Jul 2022 10:44    TPro  6.6  /  Alb  4.5  /  TBili  0.7  /  DBili  x   /  AST  22  /  ALT  11  /  AlkPhos  70  07-08    PT/INR - ( 08 Jul 2022 10:44 )   PT: 12.20 sec;   INR: 1.06 ratio         PTT - ( 08 Jul 2022 10:44 )  PTT:29.1 sec  Type and Screen X 2:    COVID-19 PCR: NotDetec (08 Jul 2022 10:44)    [ ]Pregnancy test ( if female of childbearing age) : ***  [ ]EKG: on chart   [ ]CXR: on chart       DIET: NPO after midnight  IVF: per primary team      ANTICOAGULATION STATUS ( include name of anticoagulant) :  [***] CONTINUE (**name of anticoagulant) lovenox   [***] DISCONTINUE(** name of anticoagulant)                                           A/P: Patient is a 82y y/o Female Pending ****** tomorrow    [ ] -NPO and IVF @ midnight  [ ]pain control/analgesia prn per primary team   [ ]Incentive Spirometry   [ ]F/U Clearance  [ ]F/U Pending Labs  [ ]Notify Ortho with any questions- spectra [id number]    [ ]DISCUSSED WITH PRIMARY TEAM MEMBER (name of team member): ****[id number] DR CUTLER   [ ]Date and Time DISCUSSED WITH PRIMARY TEAM MEMBER: **** 7/8

## 2022-07-08 NOTE — H&P ADULT - HISTORY OF PRESENT ILLNESS
dementia at baseline, AAO x 1, poor historian, story from     (Patient has dementia/is poor historian; hisotry gathered from  and EMS via ED note)    Patient is an 82-year old female with a past medical history of dementia (AAO x1 baseline), "leaky valve", GERD, breast cancer s/p resection in remission, rheumatic fever as a child (as per , not positive), who was bibems after being found down altered with stool and urine on b/l LE. According to , patient left the house around 8 unbeknownst to him. Around 9 he realized patient was missing, at which point he received a call from the ED. This is the first time she has left the home alone without notifying anyone. On ride to ED, patient remianed vitally stable, not given any meds. Denies any recent fever, chills, n/v, c/d, chest pains, sob, recent illness or sick contacts.    In ED, patient received trauma work-up given her altered mental status. Vitals on presentation were /78, HR 50, RR 17, T36.9, SPO2 100% on RA. Hip xray revealed a comminuted intertrochanteric fracture of the right hip. Trauma imaging was otherwise negative for any acute pathology. Labs were significant for wbc 12.94, Ca 10.2, lactate 4.0. UA negative and blood alcohol level was wnl.    Patient was seen by Orthopedics and is scheduled for surgical repair tomorrow, 22. Is being admitted to medicine for management.    medical hx:  - leaky valve, cardiologist is Dr. Banks  - rheumatic fever as a child possibly heart connection  - dementia diangosed several years ago  neurologist Dr. Mead [facility]    surgical hx:  - hysterectomy 20 around 20 years ago  - l- mastectomy for breast cancer, mother  of colon cancer    allergies:  - Lipitor  - penicillin    meds:  - f/u with pharmacy (Patient has dementia/is poor historian; hisotry gathered from  and EMS via ED note)    Patient is an 82-year old female with a past medical history of dementia (AAO x1 baseline), "leaky valve", GERD, breast cancer s/p resection in remission, rheumatic fever as a child (as per , not positive), who was bibems after being found down altered with stool and urine on b/l LE. According to , patient left the house around 8 unbeknownst to him. Around 9 he realized patient was missing, at which point he received a call from the ED. This is the first time she has left the home alone without notifying anyone. On ride to ED, patient remianed vitally stable, not given any meds. Denies any recent fever, chills, n/v, c/d, chest pains, sob, recent illness or sick contacts. Came in complaining of right hip pain.    In ED, patient received trauma work-up given her altered mental status. Vitals on presentation were /78, HR 50, RR 17, T36.9, SPO2 100% on RA. Hip xray revealed a comminuted intertrochanteric fracture of the right hip. Trauma imaging was otherwise negative for any acute pathology. Labs were significant for wbc 12.94, Ca 10.2, lactate 4.0. UA negative and blood alcohol level was wnl.    Patient was seen by Orthopedics and is scheduled for surgical repair tomorrow, 22. Is being admitted to medicine for management.    medical hx:  - leaky valve, cardiologist is Dr. Banks  - rheumatic fever as a child possibly heart connection  - dementia diangosed several years ago  neurologist Dr. Mead on [place]    surgical hx:  - hysterectomy 20 around 20 years ago  - l- mastectomy for breast cancer, mother  of colon cancer    allergies:  - Lipitor  - penicillin    meds:  - f/u with pharmacy (Patient has dementia/is poor historian; hisotry gathered from  and EMS via ED note)    Patient is an 82-year old female with a past medical history of dementia (AAO x1 baseline), "leaky valve", GERD, breast cancer s/p resection in remission, rheumatic fever as a child (as per , not positive), who was bibems after being found down altered with stool and urine on b/l LE. According to , patient left the house around 8 unbeknownst to him. Around 9 he realized patient was missing, at which point he received a call from the ED. This is the first time she has left the home alone without notifying anyone. On ride to ED, patient remianed vitally stable, not given any meds. Denies any recent fever, chills, n/v, c/d, chest pains, sob, recent illness or sick contacts. Came in complaining of right hip pain.    In ED, patient received trauma work-up given her altered mental status. Vitals on presentation were /78, HR 50, RR 17, T36.9, SPO2 100% on RA. Hip xray revealed a comminuted intertrochanteric fracture of the right hip. Trauma imaging was otherwise negative for any acute pathology. Labs were significant for wbc 12.94, Ca 10.2, lactate 4.0. UA negative and blood alcohol level was wnl.    Patient was seen by Orthopedics and is scheduled for surgical repair tomorrow, 7/9/22. Is being admitted to medicine for management.    Cardiologist: Dr. Banks  Neurologist: Dr. Millan

## 2022-07-08 NOTE — PROCEDURAL SAFETY CHECKLIST WITH OR WITHOUT SEDATION - NSBEDADDLTIME7_GEN_ALL_CORE
Able to get a hold of dad on patient's phone number  Informed dad that urine culture did not show any specific bacteria  She is feeling better  She may finish the antibiotic and then recommend that she follow up with family doctor symptoms recur  Father voices understanding 
not applicable

## 2022-07-08 NOTE — ED PROVIDER NOTE - PHYSICAL EXAMINATION
Constitutional: cachectic appearing and soaked in stool/urine. NAD  TRAUMA: ABC intact. GCS 15. eFAST negative.  Head: Normocephalic, atraumatic.  Eyes: PERRL. EOMI. No Raccoon eyes.   ENT: No nasal discharge. No septal hematoma. No Soriano sign. Mucous membranes dry  Neck: Supple. Painless ROM. No midline tenderness, stepoffs.  Cardiovascular: Normal S1, S2. Regular rate and rhythm. No murmurs, rubs, or gallops.  Pulmonary: Normal respiratory rate and effort. Lungs clear to auscultation bilaterally.   CHEST: No chest wall tenderness, crepitus. s/p L mastectomy, well healed scar  Abdominal: Soft. Nondistended. Nontender. No rebound, guarding, rigidity.  BACK: No midline L/S tenderness, stepoffs. No saddle paresthesia. T spine tenderness  Extremities. Pelvis stable. No traumatic deformities, tenderness of extremities.  Skin: No rashes, cyanosis, lacerations, abrasions.  Neuro: AAOx1. Strength 5/5 in all extremities. Sensation intact throughout. No focal neurological deficits.  Psych: Normal mood. Normal affect.

## 2022-07-08 NOTE — H&P ADULT - NSHPSOCIALHISTORY_GEN_ALL_CORE
Marital Status:  ( x  )    (   ) Single    (   )    (  )   Occupation:   Lives with: (  ) alone  (  ) children   ( x ) spouse   (  ) parents  (  ) other  Recent Travel:     Substance Use (street drugs): ( x ) never used  (  ) other:  Tobacco Usage:  ( x  ) never smoked   (   ) former smoker   (   ) current smoker  (     ) pack year  Alcohol Usage: glass of wine a day

## 2022-07-08 NOTE — H&P ADULT - NSICDXPASTMEDICALHX_GEN_ALL_CORE_FT
PAST MEDICAL HISTORY:  Breast cancer     Dementia     GERD (gastroesophageal reflux disease)     Leaky heart valve

## 2022-07-09 ENCOUNTER — TRANSCRIPTION ENCOUNTER (OUTPATIENT)
Age: 82
End: 2022-07-09

## 2022-07-09 LAB
APTT BLD: 30.4 SEC — SIGNIFICANT CHANGE UP (ref 27–39.2)
BLD GP AB SCN SERPL QL: SIGNIFICANT CHANGE UP
GLUCOSE BLDC GLUCOMTR-MCNC: 127 MG/DL — HIGH (ref 70–99)
INR BLD: 1.2 RATIO — SIGNIFICANT CHANGE UP (ref 0.65–1.3)
PROTHROM AB SERPL-ACNC: 13.8 SEC — HIGH (ref 9.95–12.87)
TROPONIN T SERPL-MCNC: <0.01 NG/ML — SIGNIFICANT CHANGE UP

## 2022-07-09 PROCEDURE — 99232 SBSQ HOSP IP/OBS MODERATE 35: CPT

## 2022-07-09 RX ORDER — MORPHINE SULFATE 50 MG/1
2 CAPSULE, EXTENDED RELEASE ORAL EVERY 6 HOURS
Refills: 0 | Status: DISCONTINUED | OUTPATIENT
Start: 2022-07-09 | End: 2022-07-09

## 2022-07-09 RX ORDER — MORPHINE SULFATE 50 MG/1
4 CAPSULE, EXTENDED RELEASE ORAL EVERY 6 HOURS
Refills: 0 | Status: DISCONTINUED | OUTPATIENT
Start: 2022-07-09 | End: 2022-07-09

## 2022-07-09 RX ORDER — METOCLOPRAMIDE HCL 10 MG
10 TABLET ORAL ONCE
Refills: 0 | Status: COMPLETED | OUTPATIENT
Start: 2022-07-09 | End: 2022-07-09

## 2022-07-09 RX ORDER — CEFAZOLIN SODIUM 1 G
2000 VIAL (EA) INJECTION EVERY 8 HOURS
Refills: 0 | Status: COMPLETED | OUTPATIENT
Start: 2022-07-09 | End: 2022-07-10

## 2022-07-09 RX ORDER — SODIUM CHLORIDE 9 MG/ML
1000 INJECTION, SOLUTION INTRAVENOUS
Refills: 0 | Status: DISCONTINUED | OUTPATIENT
Start: 2022-07-09 | End: 2022-07-10

## 2022-07-09 RX ORDER — ACETAMINOPHEN 500 MG
650 TABLET ORAL EVERY 6 HOURS
Refills: 0 | Status: DISCONTINUED | OUTPATIENT
Start: 2022-07-09 | End: 2022-07-09

## 2022-07-09 RX ORDER — HYDROMORPHONE HYDROCHLORIDE 2 MG/ML
0.5 INJECTION INTRAMUSCULAR; INTRAVENOUS; SUBCUTANEOUS
Refills: 0 | Status: DISCONTINUED | OUTPATIENT
Start: 2022-07-09 | End: 2022-07-12

## 2022-07-09 RX ADMIN — SODIUM CHLORIDE 100 MILLILITER(S): 9 INJECTION, SOLUTION INTRAVENOUS at 17:41

## 2022-07-09 RX ADMIN — Medication 100 MILLIGRAM(S): at 23:39

## 2022-07-09 RX ADMIN — HYDROMORPHONE HYDROCHLORIDE 0.5 MILLIGRAM(S): 2 INJECTION INTRAMUSCULAR; INTRAVENOUS; SUBCUTANEOUS at 15:45

## 2022-07-09 RX ADMIN — SODIUM CHLORIDE 75 MILLILITER(S): 9 INJECTION, SOLUTION INTRAVENOUS at 06:32

## 2022-07-09 RX ADMIN — Medication 10 MILLIGRAM(S): at 17:25

## 2022-07-09 RX ADMIN — HYDROMORPHONE HYDROCHLORIDE 0.5 MILLIGRAM(S): 2 INJECTION INTRAMUSCULAR; INTRAVENOUS; SUBCUTANEOUS at 17:30

## 2022-07-09 NOTE — BRIEF OPERATIVE NOTE - NSICDXBRIEFPOSTOP_GEN_ALL_CORE_FT
POST-OP DIAGNOSIS:  Closed intertrochanteric fracture of right hip 09-Jul-2022 16:32:40  Harshal Lozano

## 2022-07-09 NOTE — PROGRESS NOTE ADULT - ASSESSMENT
Patient is an 82-yo female with a pmh of dementia (AAO x1 baseline), GERD, breast cancer s/p mastectomy in remission, who was bibems after being found down altered with right hip pain. Patient scheduled for repair of hip fracture with Ortho Saturday, being admitted to medicine for management.    # R' Hip fracture  - scheduled for OR , 7/9/22  - ortho recs appr: bedrest, mechanical ppx, cardiac risk assessment by Dr. Banks    - agree with cardio regarding risk stratification  Moderate risk patient (age) and surgery (general anesthesia) and limited functional capacity, generally at moderate risk  RCRI score 1, class II, estimated risk of MACE in 30 days about 6%,   by accepting the risk she can have the surgery done  if BP surges then restart Ramipril and if not available Lisinopril 10  post op care as per the event  No endocarditis prophylaxis    #AMS on admission  # Syncope vs. Seizure vs. Mechanical Fall  # hx of dementia  - trauma imaging showed no acute intracranial pathology  - wbc 12.94 w/ lactate 4.0 on presentation s/p bolus in ED  - currently vitally stable  - gentle hydration  - f/u orthostatics, echo, trend lactate  - c/w with home medications: donepezil    # GERD  - Protonix 40 QD    Misc:  - DVT ppx: Lovenox   - GI ppx: Protonix  - Diet: NPO for surgery

## 2022-07-09 NOTE — BRIEF OPERATIVE NOTE - OPERATION/FINDINGS
abve noted fracture; post op WBAT, ABx and DVT ppx   Dict:  Implants: [place] gamma III CMN (11 x 180, 125 deg) 90 mm SHS; 5 x 32.5 mm interlocking screw abve noted fracture; post op WBAT, ABx and DVT ppx   Dict:[id number]  Implants: [place] gamma III CMN (11 x 180, 125 deg) 90 mm SHS; 5 x 32.5 mm interlocking screw

## 2022-07-09 NOTE — PROGRESS NOTE ADULT - SUBJECTIVE AND OBJECTIVE BOX
SUBJECTIVE:    Patient is a 82y old Female who presents with a chief complaint of Medical Management prior to hip fracture repair (2022 18:17)    Currently admitted to medicine with the primary diagnosis of Fall       Today is hospital day 1d. pt endorsing pain    PAST MEDICAL & SURGICAL HISTORY  Dementia    GERD (gastroesophageal reflux disease)    Breast cancer    Leaky heart valve    S/P hysterectomy    S/P left mastectomy      SOCIAL HISTORY:  Negative for smoking/alcohol/drug use.     ALLERGIES:  penicillin (Unknown)    MEDICATIONS:  STANDING MEDICATIONS  enoxaparin Injectable 40 milliGRAM(s) SubCutaneous every 24 hours  lactated ringers. 1000 milliLiter(s) IV Continuous <Continuous>  pantoprazole    Tablet 40 milliGRAM(s) Oral before breakfast    PRN MEDICATIONS  acetaminophen     Tablet .. 650 milliGRAM(s) Oral every 6 hours PRN  morphine  - Injectable 2 milliGRAM(s) IV Push every 6 hours PRN  morphine  - Injectable 4 milliGRAM(s) IV Push every 6 hours PRN    VITALS:   T(F): 97.7  HR: 85  BP: 122/58  RR: 18  SpO2: 98%    PHYSICAL EXAM:  GEN: No acute distress  LUNGS: Clear to auscultation bilaterally   HEART: S1/S2 present.   ABD: Soft, non-tender, non-distended. Bowel sounds present        LABS:                        13.4   12.94 )-----------( 141      ( 2022 10:44 )             38.2     07-08    143  |  104  |  18  ----------------------------<  156<H>  4.5   |  25  |  1.1    Ca    10.2<H>      2022 10:44    TPro  6.6  /  Alb  4.5  /  TBili  0.7  /  DBili  x   /  AST  22  /  ALT  11  /  AlkPhos  70  07-08    PT/INR - ( 2022 05:56 )   PT: 13.80 sec;   INR: 1.20 ratio         PTT - ( 2022 05:56 )  PTT:30.4 sec  Urinalysis Basic - ( 2022 11:50 )    Color: Yellow / Appearance: Clear / S.011 / pH: x  Gluc: x / Ketone: Negative  / Bili: Negative / Urobili: <2 mg/dL   Blood: x / Protein: Negative / Nitrite: Negative   Leuk Esterase: Negative / RBC: x / WBC x   Sq Epi: x / Non Sq Epi: x / Bacteria: x              CARDIAC MARKERS ( 2022 10:44 )  x     / <0.01 ng/mL / 41 U/L / x     / x            RADIOLOGY:

## 2022-07-09 NOTE — BRIEF OPERATIVE NOTE - NSICDXBRIEFPREOP_GEN_ALL_CORE_FT
PRE-OP DIAGNOSIS:  Closed intertrochanteric fracture of right hip 09-Jul-2022 16:32:33  Harshal Lozano

## 2022-07-09 NOTE — BRIEF OPERATIVE NOTE - NSICDXBRIEFPROCEDURE_GEN_ALL_CORE_FT
PROCEDURES:  ORIF of femur using Gamma nail 09-Jul-2022 16:31:30 intertrochanteric hip fracture, CPT code 19848 Harshal Lozano

## 2022-07-10 LAB
ANISOCYTOSIS BLD QL: SLIGHT — SIGNIFICANT CHANGE UP
APTT BLD: 23.8 SEC — CRITICAL LOW (ref 27–39.2)
BASOPHILS # BLD AUTO: 0 K/UL — SIGNIFICANT CHANGE UP (ref 0–0.2)
BASOPHILS # BLD AUTO: 0.01 K/UL — SIGNIFICANT CHANGE UP (ref 0–0.2)
BASOPHILS NFR BLD AUTO: 0 % — SIGNIFICANT CHANGE UP (ref 0–1)
BASOPHILS NFR BLD AUTO: 0.2 % — SIGNIFICANT CHANGE UP (ref 0–1)
D DIMER BLD IA.RAPID-MCNC: 1075 NG/ML DDU — HIGH (ref 0–230)
DACRYOCYTES BLD QL SMEAR: SLIGHT — SIGNIFICANT CHANGE UP
EOSINOPHIL # BLD AUTO: 0.01 K/UL — SIGNIFICANT CHANGE UP (ref 0–0.7)
EOSINOPHIL # BLD AUTO: 0.02 K/UL — SIGNIFICANT CHANGE UP (ref 0–0.7)
EOSINOPHIL NFR BLD AUTO: 0.2 % — SIGNIFICANT CHANGE UP (ref 0–8)
EOSINOPHIL NFR BLD AUTO: 0.3 % — SIGNIFICANT CHANGE UP (ref 0–8)
FIBRINOGEN PPP-MCNC: 514 MG/DL — SIGNIFICANT CHANGE UP (ref 204.4–570.6)
GIANT PLATELETS BLD QL SMEAR: PRESENT — SIGNIFICANT CHANGE UP
HCT VFR BLD CALC: 18.9 % — LOW (ref 37–47)
HCT VFR BLD CALC: 21.1 % — LOW (ref 37–47)
HCT VFR BLD CALC: 22.9 % — LOW (ref 37–47)
HCT VFR BLD CALC: 24.3 % — LOW (ref 37–47)
HGB BLD-MCNC: 6.6 G/DL — CRITICAL LOW (ref 12–16)
HGB BLD-MCNC: 7.3 G/DL — LOW (ref 12–16)
HGB BLD-MCNC: 8.1 G/DL — LOW (ref 12–16)
HGB BLD-MCNC: 8.5 G/DL — LOW (ref 12–16)
IMM GRANULOCYTES NFR BLD AUTO: 0.5 % — HIGH (ref 0.1–0.3)
IMM GRANULOCYTES NFR BLD AUTO: 0.6 % — HIGH (ref 0.1–0.3)
INR BLD: 1.15 RATIO — SIGNIFICANT CHANGE UP (ref 0.65–1.3)
LYMPHOCYTES # BLD AUTO: 0.81 K/UL — LOW (ref 1.2–3.4)
LYMPHOCYTES # BLD AUTO: 1.09 K/UL — LOW (ref 1.2–3.4)
LYMPHOCYTES # BLD AUTO: 13 % — LOW (ref 20.5–51.1)
LYMPHOCYTES # BLD AUTO: 17 % — LOW (ref 20.5–51.1)
MANUAL SMEAR VERIFICATION: SIGNIFICANT CHANGE UP
MCHC RBC-ENTMCNC: 30.5 PG — SIGNIFICANT CHANGE UP (ref 27–31)
MCHC RBC-ENTMCNC: 30.7 PG — SIGNIFICANT CHANGE UP (ref 27–31)
MCHC RBC-ENTMCNC: 31.2 PG — HIGH (ref 27–31)
MCHC RBC-ENTMCNC: 31.4 PG — HIGH (ref 27–31)
MCHC RBC-ENTMCNC: 34.6 G/DL — SIGNIFICANT CHANGE UP (ref 32–37)
MCHC RBC-ENTMCNC: 34.9 G/DL — SIGNIFICANT CHANGE UP (ref 32–37)
MCHC RBC-ENTMCNC: 35 G/DL — SIGNIFICANT CHANGE UP (ref 32–37)
MCHC RBC-ENTMCNC: 35.4 G/DL — SIGNIFICANT CHANGE UP (ref 32–37)
MCV RBC AUTO: 86.1 FL — SIGNIFICANT CHANGE UP (ref 81–99)
MCV RBC AUTO: 87.7 FL — SIGNIFICANT CHANGE UP (ref 81–99)
MCV RBC AUTO: 90 FL — SIGNIFICANT CHANGE UP (ref 81–99)
MCV RBC AUTO: 90.2 FL — SIGNIFICANT CHANGE UP (ref 81–99)
MICROCYTES BLD QL: SLIGHT — SIGNIFICANT CHANGE UP
MONOCYTES # BLD AUTO: 0.55 K/UL — SIGNIFICANT CHANGE UP (ref 0.1–0.6)
MONOCYTES # BLD AUTO: 0.62 K/UL — HIGH (ref 0.1–0.6)
MONOCYTES NFR BLD AUTO: 8.6 % — SIGNIFICANT CHANGE UP (ref 1.7–9.3)
MONOCYTES NFR BLD AUTO: 9.9 % — HIGH (ref 1.7–9.3)
NEUTROPHILS # BLD AUTO: 4.72 K/UL — SIGNIFICANT CHANGE UP (ref 1.4–6.5)
NEUTROPHILS # BLD AUTO: 4.76 K/UL — SIGNIFICANT CHANGE UP (ref 1.4–6.5)
NEUTROPHILS NFR BLD AUTO: 73.4 % — SIGNIFICANT CHANGE UP (ref 42.2–75.2)
NEUTROPHILS NFR BLD AUTO: 76.3 % — HIGH (ref 42.2–75.2)
NEUTS BAND # BLD: 1.8 % — SIGNIFICANT CHANGE UP (ref 0–6)
NRBC # BLD: 0 /100 WBCS — SIGNIFICANT CHANGE UP (ref 0–0)
OVALOCYTES BLD QL SMEAR: SLIGHT — SIGNIFICANT CHANGE UP
PLAT MORPH BLD: NORMAL — SIGNIFICANT CHANGE UP
PLATELET # BLD AUTO: 62 K/UL — LOW (ref 130–400)
PLATELET # BLD AUTO: 67 K/UL — LOW (ref 130–400)
PLATELET # BLD AUTO: 70 K/UL — LOW (ref 130–400)
PLATELET # BLD AUTO: 77 K/UL — LOW (ref 130–400)
POIKILOCYTOSIS BLD QL AUTO: SLIGHT — SIGNIFICANT CHANGE UP
POLYCHROMASIA BLD QL SMEAR: SLIGHT — SIGNIFICANT CHANGE UP
PROTHROM AB SERPL-ACNC: 13.2 SEC — HIGH (ref 9.95–12.87)
RBC # BLD: 2.1 M/UL — LOW (ref 4.2–5.4)
RBC # BLD: 2.34 M/UL — LOW (ref 4.2–5.4)
RBC # BLD: 2.66 M/UL — LOW (ref 4.2–5.4)
RBC # BLD: 2.77 M/UL — LOW (ref 4.2–5.4)
RBC # FLD: 13.2 % — SIGNIFICANT CHANGE UP (ref 11.5–14.5)
RBC # FLD: 13.4 % — SIGNIFICANT CHANGE UP (ref 11.5–14.5)
RBC # FLD: 13.7 % — SIGNIFICANT CHANGE UP (ref 11.5–14.5)
RBC # FLD: 13.9 % — SIGNIFICANT CHANGE UP (ref 11.5–14.5)
RBC BLD AUTO: ABNORMAL
WBC # BLD: 5.34 K/UL — SIGNIFICANT CHANGE UP (ref 4.8–10.8)
WBC # BLD: 6.24 K/UL — SIGNIFICANT CHANGE UP (ref 4.8–10.8)
WBC # BLD: 6.42 K/UL — SIGNIFICANT CHANGE UP (ref 4.8–10.8)
WBC # BLD: 7.75 K/UL — SIGNIFICANT CHANGE UP (ref 4.8–10.8)
WBC # FLD AUTO: 5.34 K/UL — SIGNIFICANT CHANGE UP (ref 4.8–10.8)
WBC # FLD AUTO: 6.24 K/UL — SIGNIFICANT CHANGE UP (ref 4.8–10.8)
WBC # FLD AUTO: 6.42 K/UL — SIGNIFICANT CHANGE UP (ref 4.8–10.8)
WBC # FLD AUTO: 7.75 K/UL — SIGNIFICANT CHANGE UP (ref 4.8–10.8)

## 2022-07-10 PROCEDURE — 93306 TTE W/DOPPLER COMPLETE: CPT | Mod: 26

## 2022-07-10 PROCEDURE — 99232 SBSQ HOSP IP/OBS MODERATE 35: CPT

## 2022-07-10 RX ORDER — SODIUM CHLORIDE 9 MG/ML
500 INJECTION INTRAMUSCULAR; INTRAVENOUS; SUBCUTANEOUS ONCE
Refills: 0 | Status: COMPLETED | OUTPATIENT
Start: 2022-07-10 | End: 2022-07-10

## 2022-07-10 RX ORDER — SODIUM CHLORIDE 9 MG/ML
500 INJECTION INTRAMUSCULAR; INTRAVENOUS; SUBCUTANEOUS
Refills: 0 | Status: DISCONTINUED | OUTPATIENT
Start: 2022-07-10 | End: 2022-07-10

## 2022-07-10 RX ADMIN — Medication 100 MILLIGRAM(S): at 05:51

## 2022-07-10 RX ADMIN — SODIUM CHLORIDE 2000 MILLILITER(S): 9 INJECTION INTRAMUSCULAR; INTRAVENOUS; SUBCUTANEOUS at 04:33

## 2022-07-10 RX ADMIN — Medication 100 MILLIGRAM(S): at 13:43

## 2022-07-10 NOTE — PHYSICAL THERAPY INITIAL EVALUATION ADULT - SPECIFY REASON(S)
chart review reveals patient currently has HbG of 6.6 and will be transfused. patient not appropriate for OOB today, will F/U when medically stable.

## 2022-07-10 NOTE — PROGRESS NOTE ADULT - ASSESSMENT
Patient is an 82-yo female with a pmh of dementia (AAO x1 baseline), GERD, breast cancer s/p mastectomy in remission, who was bibems after being found down altered with right hip pain. Patient scheduled for repair of hip fracture with Ortho Saturday, being admitted to medicine for management.    # R' Hip fracture s/p orif   - scheduled for OR , 7/9/22  - Pain control  - activity per Ortho  - monitor for signs of Hematoma  post op care as per the event / ortho  No endocarditis prophylaxis    #) hb drop / Thrombocytopenia  - Receiving one unit of PRBC  Labs looks dilution  will repeat and Send DIC panel  HD stable     #AMS on admission  # Syncope vs. Seizure vs. Mechanical Fall  # hx of dementia  - trauma imaging showed no acute intracranial pathology  - wbc 12.94 w/ lactate 4.0 on presentation s/p bolus in ED  - f/u orthostatics, echo,    - c/w with home medications: donepezil    # GERD  - Protonix 40 QD    Misc:  - DVT ppx: Lovenox once cleared by ortho  - GI ppx: Protonix  - Diet: Acute

## 2022-07-10 NOTE — PROGRESS NOTE ADULT - SUBJECTIVE AND OBJECTIVE BOX
SUBJECTIVE:    Patient is a 82y old Female who presents with a chief complaint of Medical Management prior to hip fracture repair (10 Jul 2022 00:02)    Currently admitted to medicine with the primary diagnosis of Fall       Today is hospital day 2d. s/p ORIF , dropped Hb from 13 to 7.3. Receiving PRBC on interview and getting echo. spoke to daughter in Law.    PAST MEDICAL & SURGICAL HISTORY  Dementia    GERD (gastroesophageal reflux disease)    Breast cancer    Leaky heart valve    S/P hysterectomy    S/P left mastectomy      SOCIAL HISTORY:  Negative for smoking/alcohol/drug use.     ALLERGIES:  penicillin (Unknown)    MEDICATIONS:  STANDING MEDICATIONS  ceFAZolin   IVPB 2000 milliGRAM(s) IV Intermittent every 8 hours  lactated ringers. 1000 milliLiter(s) IV Continuous <Continuous>    PRN MEDICATIONS  HYDROmorphone  Injectable 0.5 milliGRAM(s) IV Push every 10 minutes PRN    VITALS:   T(F): 98.2  HR: 69  BP: 126/58  RR: 15  SpO2: 100%    PHYSICAL EXAM:  GEN: No acute distress  LUNGS: Clear to auscultation bilaterally   HEART: S1/S2 present. RRR.   ABD: Soft, non-tender, non-distended. Bowel sounds present  EXT: NC/NC/NE/2+PP/BOB  NEURO: AAOX3      LABS:                        6.6    5.34  )-----------( 62       ( 10 Jul 2022 06:30 )             18.9           PT/INR - ( 2022 05:56 )   PT: 13.80 sec;   INR: 1.20 ratio         PTT - ( 2022 05:56 )  PTT:30.4 sec  Urinalysis Basic - ( 2022 11:50 )    Color: Yellow / Appearance: Clear / S.011 / pH: x  Gluc: x / Ketone: Negative  / Bili: Negative / Urobili: <2 mg/dL   Blood: x / Protein: Negative / Nitrite: Negative   Leuk Esterase: Negative / RBC: x / WBC x   Sq Epi: x / Non Sq Epi: x / Bacteria: x          Troponin T, Serum: <0.01 ng/mL (22 @ 20:24)      CARDIAC MARKERS ( 2022 20:24 )  x     / <0.01 ng/mL / x     / x     / x            RADIOLOGY:

## 2022-07-10 NOTE — PROGRESS NOTE ADULT - SUBJECTIVE AND OBJECTIVE BOX
Orthopedic Post-Operative Check    Procedure: ORIF Right Femur with Gamma nail  EBL: 100 mL  IV Fluids: 1000 mL    Patient seen and examined postoperatively.  No acute issues.  Pain well controlled, tolerating PO intake.  Denies nausea/vomiting, fever/chills, chest pain/SOB.    T(C): 36.7 (07-09-22 @ 16:37), Max: 37.2 (07-09-22 @ 13:56)  HR: 92 (07-10-22 @ 03:15) (72 - 119)  BP: 90/51 (07-10-22 @ 03:15) (76/47 - 140/66)  RR: 14 (07-10-22 @ 03:15) (11 - 22)  SpO2: 100% (07-10-22 @ 03:15) (95% - 100%)    No apparent distress  Nonlabored breathing    RLE  Dressing C/D/I  Motor intact EHL/FHL/TA/Gastroc  SILT s/s/sp/dp/t distribution  Palpable DP/PT pulses, foot/toes wwp    07-10-22 @ 00:00  H/H: 7.3/21.1  WBC: 7.75    A/P: 82yFemale s/p procedure as above  - Weightbearing WBAT  - Abx for 24 hours  - DVT prophylaxis  - AM labs  - Monitor dressing  - PT/OT/Rehab  - Dispo planning

## 2022-07-11 LAB
ANION GAP SERPL CALC-SCNC: 11 MMOL/L — SIGNIFICANT CHANGE UP (ref 7–14)
BUN SERPL-MCNC: 9 MG/DL — LOW (ref 10–20)
CALCIUM SERPL-MCNC: 7.9 MG/DL — LOW (ref 8.5–10.1)
CHLORIDE SERPL-SCNC: 104 MMOL/L — SIGNIFICANT CHANGE UP (ref 98–110)
CO2 SERPL-SCNC: 25 MMOL/L — SIGNIFICANT CHANGE UP (ref 17–32)
CREAT SERPL-MCNC: 0.7 MG/DL — SIGNIFICANT CHANGE UP (ref 0.7–1.5)
EGFR: 86 ML/MIN/1.73M2 — SIGNIFICANT CHANGE UP
GLUCOSE SERPL-MCNC: 90 MG/DL — SIGNIFICANT CHANGE UP (ref 70–99)
HCT VFR BLD CALC: 23.1 % — LOW (ref 37–47)
HGB BLD-MCNC: 8.3 G/DL — LOW (ref 12–16)
MCHC RBC-ENTMCNC: 30.9 PG — SIGNIFICANT CHANGE UP (ref 27–31)
MCHC RBC-ENTMCNC: 35.9 G/DL — SIGNIFICANT CHANGE UP (ref 32–37)
MCV RBC AUTO: 85.9 FL — SIGNIFICANT CHANGE UP (ref 81–99)
NRBC # BLD: 0 /100 WBCS — SIGNIFICANT CHANGE UP (ref 0–0)
PLATELET # BLD AUTO: 67 K/UL — LOW (ref 130–400)
POTASSIUM SERPL-MCNC: 2.8 MMOL/L — LOW (ref 3.5–5)
POTASSIUM SERPL-SCNC: 2.8 MMOL/L — LOW (ref 3.5–5)
RBC # BLD: 2.69 M/UL — LOW (ref 4.2–5.4)
RBC # FLD: 14.1 % — SIGNIFICANT CHANGE UP (ref 11.5–14.5)
SODIUM SERPL-SCNC: 140 MMOL/L — SIGNIFICANT CHANGE UP (ref 135–146)
WBC # BLD: 4.87 K/UL — SIGNIFICANT CHANGE UP (ref 4.8–10.8)
WBC # FLD AUTO: 4.87 K/UL — SIGNIFICANT CHANGE UP (ref 4.8–10.8)

## 2022-07-11 PROCEDURE — 93970 EXTREMITY STUDY: CPT | Mod: 26

## 2022-07-11 PROCEDURE — 99233 SBSQ HOSP IP/OBS HIGH 50: CPT

## 2022-07-11 RX ORDER — POTASSIUM CHLORIDE 20 MEQ
40 PACKET (EA) ORAL EVERY 4 HOURS
Refills: 0 | Status: COMPLETED | OUTPATIENT
Start: 2022-07-11 | End: 2022-07-11

## 2022-07-11 RX ORDER — QUETIAPINE FUMARATE 200 MG/1
25 TABLET, FILM COATED ORAL AT BEDTIME
Refills: 0 | Status: DISCONTINUED | OUTPATIENT
Start: 2022-07-11 | End: 2022-07-12

## 2022-07-11 RX ORDER — MIRTAZAPINE 45 MG/1
7.5 TABLET, ORALLY DISINTEGRATING ORAL DAILY
Refills: 0 | Status: DISCONTINUED | OUTPATIENT
Start: 2022-07-11 | End: 2022-07-13

## 2022-07-11 RX ORDER — PANTOPRAZOLE SODIUM 20 MG/1
40 TABLET, DELAYED RELEASE ORAL
Refills: 0 | Status: DISCONTINUED | OUTPATIENT
Start: 2022-07-11 | End: 2022-07-13

## 2022-07-11 RX ORDER — ESCITALOPRAM OXALATE 10 MG/1
20 TABLET, FILM COATED ORAL DAILY
Refills: 0 | Status: DISCONTINUED | OUTPATIENT
Start: 2022-07-11 | End: 2022-07-13

## 2022-07-11 RX ORDER — QUETIAPINE FUMARATE 200 MG/1
25 TABLET, FILM COATED ORAL
Refills: 0 | Status: DISCONTINUED | OUTPATIENT
Start: 2022-07-11 | End: 2022-07-13

## 2022-07-11 RX ORDER — DONEPEZIL HYDROCHLORIDE 10 MG/1
10 TABLET, FILM COATED ORAL AT BEDTIME
Refills: 0 | Status: DISCONTINUED | OUTPATIENT
Start: 2022-07-11 | End: 2022-07-13

## 2022-07-11 RX ORDER — SIMVASTATIN 20 MG/1
20 TABLET, FILM COATED ORAL AT BEDTIME
Refills: 0 | Status: DISCONTINUED | OUTPATIENT
Start: 2022-07-11 | End: 2022-07-13

## 2022-07-11 RX ORDER — LISINOPRIL 2.5 MG/1
20 TABLET ORAL DAILY
Refills: 0 | Status: DISCONTINUED | OUTPATIENT
Start: 2022-07-11 | End: 2022-07-13

## 2022-07-11 RX ORDER — HYDROXYZINE HCL 10 MG
10 TABLET ORAL AT BEDTIME
Refills: 0 | Status: DISCONTINUED | OUTPATIENT
Start: 2022-07-11 | End: 2022-07-13

## 2022-07-11 RX ORDER — ENOXAPARIN SODIUM 100 MG/ML
40 INJECTION SUBCUTANEOUS EVERY 24 HOURS
Refills: 0 | Status: DISCONTINUED | OUTPATIENT
Start: 2022-07-11 | End: 2022-07-12

## 2022-07-11 RX ADMIN — ESCITALOPRAM OXALATE 20 MILLIGRAM(S): 10 TABLET, FILM COATED ORAL at 12:28

## 2022-07-11 RX ADMIN — QUETIAPINE FUMARATE 25 MILLIGRAM(S): 200 TABLET, FILM COATED ORAL at 12:28

## 2022-07-11 RX ADMIN — SIMVASTATIN 20 MILLIGRAM(S): 20 TABLET, FILM COATED ORAL at 22:03

## 2022-07-11 RX ADMIN — PANTOPRAZOLE SODIUM 40 MILLIGRAM(S): 20 TABLET, DELAYED RELEASE ORAL at 12:29

## 2022-07-11 RX ADMIN — LISINOPRIL 20 MILLIGRAM(S): 2.5 TABLET ORAL at 12:28

## 2022-07-11 RX ADMIN — Medication 40 MILLIEQUIVALENT(S): at 15:37

## 2022-07-11 RX ADMIN — MIRTAZAPINE 7.5 MILLIGRAM(S): 45 TABLET, ORALLY DISINTEGRATING ORAL at 12:46

## 2022-07-11 RX ADMIN — QUETIAPINE FUMARATE 25 MILLIGRAM(S): 200 TABLET, FILM COATED ORAL at 22:03

## 2022-07-11 RX ADMIN — DONEPEZIL HYDROCHLORIDE 10 MILLIGRAM(S): 10 TABLET, FILM COATED ORAL at 22:02

## 2022-07-11 RX ADMIN — ENOXAPARIN SODIUM 40 MILLIGRAM(S): 100 INJECTION SUBCUTANEOUS at 14:31

## 2022-07-11 RX ADMIN — Medication 40 MILLIEQUIVALENT(S): at 19:02

## 2022-07-11 RX ADMIN — Medication 10 MILLIGRAM(S): at 22:02

## 2022-07-11 NOTE — PHYSICAL THERAPY INITIAL EVALUATION ADULT - GENERAL OBSERVATIONS, REHAB EVAL
PT IE 8-8:30am. Patient left in supine in NAD. +call bell, +bed alarm, +confusion (maximum verbal and tactile cues to complete tasks safely).

## 2022-07-11 NOTE — PHYSICAL THERAPY INITIAL EVALUATION ADULT - GAIT DISTANCE, PT EVAL
Unable to walk further due to R LE weakness and confusion (inability to fully follow commands efficiently)./bed to chair/chair to bed

## 2022-07-11 NOTE — PROGRESS NOTE ADULT - SUBJECTIVE AND OBJECTIVE BOX
SINGLER, PA  82y, Female  Allergy: penicillin (Unknown)    Hospital Day: 3d    Patient seen and examined. No acute events overnight    PMH/PSH:  PAST MEDICAL & SURGICAL HISTORY:  Dementia      GERD (gastroesophageal reflux disease)      Breast cancer      Leaky heart valve      S/P hysterectomy      S/P left mastectomy          VITALS:  T(F): 98.5 (07-11-22 @ 12:15), Max: 98.7 (07-11-22 @ 07:15)  HR: 75 (07-11-22 @ 12:15)  BP: 126/61 (07-11-22 @ 12:15) (126/61 - 136/65)  RR: 18 (07-11-22 @ 12:15)  SpO2: 96% (07-11-22 @ 12:15)    TESTS & MEASUREMENTS:  Weight (Kg):   BMI (kg/m2): 21 (07-09)    07-09-22 @ 07:01  -  07-10-22 @ 07:00  --------------------------------------------------------  IN: 0 mL / OUT: 200 mL / NET: -200 mL    07-10-22 @ 07:01  -  07-11-22 @ 07:00  --------------------------------------------------------  IN: 680 mL / OUT: 1920 mL / NET: -1240 mL    07-11-22 @ 07:01  -  07-11-22 @ 17:43  --------------------------------------------------------  IN: 0 mL / OUT: 900 mL / NET: -900 mL                            8.3    4.87  )-----------( 67       ( 11 Jul 2022 11:20 )             23.1     PT/INR - ( 10 Jul 2022 11:53 )   PT: 13.20 sec;   INR: 1.15 ratio         PTT - ( 10 Jul 2022 11:53 )  PTT:23.8 sec  07-11    140  |  104  |  9<L>  ----------------------------<  90  2.8<L>   |  25  |  0.7    Ca    7.9<L>      11 Jul 2022 11:20    CARDIAC MARKERS ( 09 Jul 2022 20:24 )  x     / <0.01 ng/mL / x     / x     / x        RECENT DIAGNOSTIC ORDERS:  Type + Screen: AM  Sched. Collection:12-Jul-2022 04:30 (07-11-22 @ 14:43)  Magnesium, Serum: AM Sched. Collection: 12-Jul-2022 04:30 (07-11-22 @ 14:43)  Comprehensive Metabolic Panel: AM Sched. Collection: 12-Jul-2022 04:30 (07-11-22 @ 14:43)  Complete Blood Count + Automated Diff: AM Sched. Collection: 12-Jul-2022 04:30 (07-11-22 @ 14:43)  Magnesium, Serum: STAT (07-11-22 @ 13:25)    MEDICATIONS:  MEDICATIONS  (STANDING):  donepezil 10 milliGRAM(s) Oral at bedtime  enoxaparin Injectable 40 milliGRAM(s) SubCutaneous every 24 hours  escitalopram 20 milliGRAM(s) Oral daily  hydrOXYzine  Oral Tab/Cap - Peds 10 milliGRAM(s) Oral at bedtime  lisinopril 20 milliGRAM(s) Oral daily  mirtazapine 7.5 milliGRAM(s) Oral daily  pantoprazole    Tablet 40 milliGRAM(s) Oral before breakfast  potassium chloride    Tablet ER 40 milliEquivalent(s) Oral every 4 hours  simvastatin 20 milliGRAM(s) Oral at bedtime    MEDICATIONS  (PRN):  HYDROmorphone  Injectable 0.5 milliGRAM(s) IV Push every 10 minutes PRN Moderate Pain (4 - 6)  QUEtiapine 25 milliGRAM(s) Oral two times a day PRN agitation    HOME MEDICATIONS:  donepezil 10 mg oral tablet (07-08)  escitalopram 20 mg oral tablet (07-08)  hydrOXYzine hydrochloride 10 mg oral tablet (07-08)  pantoprazole 40 mg oral delayed release tablet (07-08)  ramipril 5 mg oral capsule (07-11)  Remeron (07-08)  simvastatin 20 mg oral tablet (07-11)  Vascepa 1 g oral capsule (07-11)      REVIEW OF SYSTEMS:  All other review of systems is negative unless indicated above.     PHYSICAL EXAM:  PHYSICAL EXAM:  GENERAL: NAD, well-developed  HEAD:  Atraumatic, Normocephalic  NECK: Supple, No JVD  CHEST/LUNG: Clear to auscultation bilaterally; No wheeze  HEART: Regular rate and rhythm; No murmurs, rubs, or gallops  ABDOMEN: Soft, Nontender, Nondistended; Bowel sounds present  EXTREMITIES:  2+ Peripheral Pulses, No clubbing, cyanosis, or edema  SKIN: No rashes or lesions

## 2022-07-11 NOTE — PROGRESS NOTE ADULT - ASSESSMENT
82-yo female with a pmh of dementia (AAO x1 baseline), GERD, breast cancer s/p mastectomy in remission, who was bibems after being found down altered with right hip pain. Patient scheduled for repair of hip fracture with Ortho Saturday, being admitted to medicine for management.    # R' Hip fracture s/p orif 07/09  - Pain control  - activity per Ortho  - monitor for signs of Hematoma  post op care as per the event / ortho  No endocarditis prophylaxis    #) Acute blood loss anemia  s/p 1U PRBC  Hg stable, 8.3 today  - Monitor CBC  - Lovenox resumed for DVT PPX    #Delirium  Likely post-op and hospital acquired. Pt has not been getting any sleep in the PACU  - Seroquel qhs and PRN    #Unwitnessed fall  # hx of dementia  r/o cardiac arrhythmia, syncope workup  Echo 07/10: 60-65% EF  No events noted on telemetry  - trauma imaging showed no acute intracranial pathology  - wbc 12.94 w/ lactate 4.0 on presentation s/p bolus in ED  - c/w with home medications: donepezil  - DC TELE    # GERD  - Protonix 40 QD    DVT PPX, Lovenox    #Progress Note Handoff  Pending (specify): PT eval, Dispo  Family discussion: d/w family regarding management of delirium, dc planning to STR  Disposition: likely needs STR   82-yo female with a pmh of dementia (AAO x1 baseline), GERD, breast cancer s/p mastectomy in remission, who was bibems after being found down altered with right hip pain. Patient scheduled for repair of hip fracture with Ortho Saturday, being admitted to medicine for management.    # R' Hip fracture s/p orif 07/09  - Pain control  - activity per Ortho  - monitor for signs of Hematoma  post op care as per the event / ortho  No endocarditis prophylaxis    #) Acute blood loss anemia  s/p 1U PRBC  Hg stable, 8.3 today  - Monitor CBC  - Lovenox resumed for DVT PPX    #Thrombocytopenia  Unclear etiology  Unlikely DIC, fibrinogen wnl  Hemodynamically stable  - Cont monitor CBC  - Hematology eval requested    #Delirium  Likely post-op and hospital acquired. Pt has not been getting any sleep in the PACU  - Seroquel qhs and PRN    #Unwitnessed fall  # hx of dementia  r/o cardiac arrhythmia, syncope workup  Echo 07/10: 60-65% EF  No events noted on telemetry  - trauma imaging showed no acute intracranial pathology  - wbc 12.94 w/ lactate 4.0 on presentation s/p bolus in ED  - c/w with home medications: donepezil  - DC TELE    # GERD  - Protonix 40 QD    DVT PPX, Lovenox    #Progress Note Handoff  Pending (specify): hematology eval, PT eval, Dispo  Family discussion: d/w family regarding management of delirium, dc planning to STR  Disposition: likely needs STR

## 2022-07-11 NOTE — PATIENT PROFILE ADULT - NSPROIMPLANTSMEDDEV_GEN_A_NUR
June 22, 2020      Aj Engel MD  [address] [facility] [address]  [address]  [facility] [address]           Gurvinder Morales - [facility]  [address], [address]  [facility] [address]  Phone: [phone]  Fax: [phone]          Patient: Jessa Morales   MR Number: [id number]   YOB: 2019   Date of Visit: 6/22/2020       Dear Dr. Aj Engel:    Thank you for referring Jessa Morales to me for evaluation. Attached you will find relevant portions of my assessment and plan of care.    If you have questions, please do not hesitate to call me. I look forward to following Jessa Morales along with you.    Sincerely,    Maria Guadalupe Sue MD    Enclosure  CC:  No Recipients    If you would like to receive this communication electronically, please contact [email or web address] or [phone] to request more information on [employer] access.    For providers and/or their staff who would like to refer a patient to [facility], please contact us through our one-stop-shop provider referral line, [facility], at [phone].    If you feel you have received this communication in error or would no longer like to receive these types of communications, please e-mail [email or web address]          None

## 2022-07-11 NOTE — PROGRESS NOTE ADULT - SUBJECTIVE AND OBJECTIVE BOX
s/p right hip orif pod 2  pt seen at bedside pain controlled is confused as per rn   pt in pacu awaiting tele bed     Vital Signs Last 24 Hrs  T(C): 37.1 (11 Jul 2022 07:15), Max: 37.1 (11 Jul 2022 07:15)  T(F): 98.7 (11 Jul 2022 07:15), Max: 98.7 (11 Jul 2022 07:15)  HR: 85 (11 Jul 2022 07:15) (65 - 85)  BP: 136/63 (11 Jul 2022 07:15) (91/55 - 136/65)  BP(mean): 91 (11 Jul 2022 07:15) (91 - 91)  RR: 18 (11 Jul 2022 07:15) (15 - 26)  SpO2: 97% (11 Jul 2022 07:15) (96% - 100%)                          8.1    6.42  )-----------( 70       ( 10 Jul 2022 15:55 )             22.9       right hip: dressing in place c/d/i   nvid df/pf intact   calf soft nt

## 2022-07-11 NOTE — PROGRESS NOTE ADULT - ASSESSMENT
s/p right hip orif pod 2     pain control   dvt proph discussed with medical team in regards to restarting   am labs currently refusing   pt ot   wbat   med management   dispo planning

## 2022-07-11 NOTE — PATIENT PROFILE ADULT - FALL HARM RISK - HARM RISK INTERVENTIONS

## 2022-07-12 LAB
ALBUMIN SERPL ELPH-MCNC: 2.9 G/DL — LOW (ref 3.5–5.2)
ALP SERPL-CCNC: 46 U/L — SIGNIFICANT CHANGE UP (ref 30–115)
ALT FLD-CCNC: 6 U/L — SIGNIFICANT CHANGE UP (ref 0–41)
ANION GAP SERPL CALC-SCNC: 11 MMOL/L — SIGNIFICANT CHANGE UP (ref 7–14)
AST SERPL-CCNC: 19 U/L — SIGNIFICANT CHANGE UP (ref 0–41)
BASOPHILS # BLD AUTO: 0.01 K/UL — SIGNIFICANT CHANGE UP (ref 0–0.2)
BASOPHILS NFR BLD AUTO: 0.3 % — SIGNIFICANT CHANGE UP (ref 0–1)
BILIRUB SERPL-MCNC: 0.8 MG/DL — SIGNIFICANT CHANGE UP (ref 0.2–1.2)
BLD GP AB SCN SERPL QL: SIGNIFICANT CHANGE UP
BUN SERPL-MCNC: 8 MG/DL — LOW (ref 10–20)
CALCIUM SERPL-MCNC: 8.2 MG/DL — LOW (ref 8.5–10.1)
CHLORIDE SERPL-SCNC: 109 MMOL/L — SIGNIFICANT CHANGE UP (ref 98–110)
CO2 SERPL-SCNC: 24 MMOL/L — SIGNIFICANT CHANGE UP (ref 17–32)
CREAT SERPL-MCNC: 0.7 MG/DL — SIGNIFICANT CHANGE UP (ref 0.7–1.5)
EGFR: 86 ML/MIN/1.73M2 — SIGNIFICANT CHANGE UP
EOSINOPHIL # BLD AUTO: 0.19 K/UL — SIGNIFICANT CHANGE UP (ref 0–0.7)
EOSINOPHIL NFR BLD AUTO: 5.4 % — SIGNIFICANT CHANGE UP (ref 0–8)
GLUCOSE BLDC GLUCOMTR-MCNC: 123 MG/DL — HIGH (ref 70–99)
GLUCOSE SERPL-MCNC: 90 MG/DL — SIGNIFICANT CHANGE UP (ref 70–99)
HCT VFR BLD CALC: 21.4 % — LOW (ref 37–47)
HGB BLD-MCNC: 7.5 G/DL — LOW (ref 12–16)
IMM GRANULOCYTES NFR BLD AUTO: 0.6 % — HIGH (ref 0.1–0.3)
LYMPHOCYTES # BLD AUTO: 0.72 K/UL — LOW (ref 1.2–3.4)
LYMPHOCYTES # BLD AUTO: 20.3 % — LOW (ref 20.5–51.1)
MAGNESIUM SERPL-MCNC: 1.6 MG/DL — LOW (ref 1.8–2.4)
MCHC RBC-ENTMCNC: 30.7 PG — SIGNIFICANT CHANGE UP (ref 27–31)
MCHC RBC-ENTMCNC: 35 G/DL — SIGNIFICANT CHANGE UP (ref 32–37)
MCV RBC AUTO: 87.7 FL — SIGNIFICANT CHANGE UP (ref 81–99)
MONOCYTES # BLD AUTO: 0.3 K/UL — SIGNIFICANT CHANGE UP (ref 0.1–0.6)
MONOCYTES NFR BLD AUTO: 8.5 % — SIGNIFICANT CHANGE UP (ref 1.7–9.3)
NEUTROPHILS # BLD AUTO: 2.3 K/UL — SIGNIFICANT CHANGE UP (ref 1.4–6.5)
NEUTROPHILS NFR BLD AUTO: 64.9 % — SIGNIFICANT CHANGE UP (ref 42.2–75.2)
NRBC # BLD: 0 /100 WBCS — SIGNIFICANT CHANGE UP (ref 0–0)
PLATELET # BLD AUTO: 67 K/UL — LOW (ref 130–400)
POTASSIUM SERPL-MCNC: 4.2 MMOL/L — SIGNIFICANT CHANGE UP (ref 3.5–5)
POTASSIUM SERPL-SCNC: 4.2 MMOL/L — SIGNIFICANT CHANGE UP (ref 3.5–5)
PROT SERPL-MCNC: 4.5 G/DL — LOW (ref 6–8)
RBC # BLD: 2.44 M/UL — LOW (ref 4.2–5.4)
RBC # FLD: 14.1 % — SIGNIFICANT CHANGE UP (ref 11.5–14.5)
SODIUM SERPL-SCNC: 144 MMOL/L — SIGNIFICANT CHANGE UP (ref 135–146)
WBC # BLD: 3.54 K/UL — LOW (ref 4.8–10.8)
WBC # FLD AUTO: 3.54 K/UL — LOW (ref 4.8–10.8)

## 2022-07-12 PROCEDURE — 99233 SBSQ HOSP IP/OBS HIGH 50: CPT

## 2022-07-12 RX ORDER — SODIUM CHLORIDE 9 MG/ML
1000 INJECTION, SOLUTION INTRAVENOUS
Refills: 0 | Status: DISCONTINUED | OUTPATIENT
Start: 2022-07-12 | End: 2022-07-13

## 2022-07-12 RX ORDER — SENNA PLUS 8.6 MG/1
2 TABLET ORAL AT BEDTIME
Refills: 0 | Status: DISCONTINUED | OUTPATIENT
Start: 2022-07-12 | End: 2022-07-13

## 2022-07-12 RX ORDER — FONDAPARINUX SODIUM 2.5 MG/.5ML
2.5 INJECTION, SOLUTION SUBCUTANEOUS DAILY
Refills: 0 | Status: DISCONTINUED | OUTPATIENT
Start: 2022-07-12 | End: 2022-07-13

## 2022-07-12 RX ADMIN — PANTOPRAZOLE SODIUM 40 MILLIGRAM(S): 20 TABLET, DELAYED RELEASE ORAL at 06:31

## 2022-07-12 RX ADMIN — SODIUM CHLORIDE 80 MILLILITER(S): 9 INJECTION, SOLUTION INTRAVENOUS at 18:18

## 2022-07-12 RX ADMIN — MIRTAZAPINE 7.5 MILLIGRAM(S): 45 TABLET, ORALLY DISINTEGRATING ORAL at 18:18

## 2022-07-12 RX ADMIN — SENNA PLUS 2 TABLET(S): 8.6 TABLET ORAL at 22:22

## 2022-07-12 RX ADMIN — ESCITALOPRAM OXALATE 20 MILLIGRAM(S): 10 TABLET, FILM COATED ORAL at 12:53

## 2022-07-12 RX ADMIN — DONEPEZIL HYDROCHLORIDE 10 MILLIGRAM(S): 10 TABLET, FILM COATED ORAL at 22:22

## 2022-07-12 RX ADMIN — FONDAPARINUX SODIUM 2.5 MILLIGRAM(S): 2.5 INJECTION, SOLUTION SUBCUTANEOUS at 12:52

## 2022-07-12 RX ADMIN — SIMVASTATIN 20 MILLIGRAM(S): 20 TABLET, FILM COATED ORAL at 22:22

## 2022-07-12 RX ADMIN — LISINOPRIL 20 MILLIGRAM(S): 2.5 TABLET ORAL at 06:31

## 2022-07-12 NOTE — PROGRESS NOTE ADULT - SUBJECTIVE AND OBJECTIVE BOX
Orthopaedic Surgery Progress Note    S&E at bedside this AM. Pain well controlled. Patient walked with PT yesterday form bed to chair with rolling walker. Denies fever/chills/CP/SOB. No other complaints      T(C): 36.3 (07-12-22 @ 04:46), Max: 36.9 (07-11-22 @ 12:15)  HR: 91 (07-12-22 @ 04:46) (75 - 100)  BP: 124/71 (07-12-22 @ 04:46) (105/55 - 135/68)  RR: 18 (07-12-22 @ 04:46) (16 - 22)  SpO2: 96% (07-12-22 @ 00:19) (96% - 100%)    P/E:  Alert, NAD  Nonlabored breathing    RLE:  Spotting noted on dressing  Dressing changed, surgical incision c/d/i with staples in place  New gauze/tegaderm dressing placed  SILT sp/dp/t/sural/saph  Firing ta/ehl/fhl/gs  Foot WWP, 2+ DP pulse    Labs                        7.5    3.54  )-----------( 67       ( 12 Jul 2022 07:46 )             21.4     07-12    144  |  109  |  8<L>  ----------------------------<  90  4.2   |  24  |  0.7    Ca    8.2<L>      12 Jul 2022 07:46  Mg     1.6     07-12    TPro  4.5<L>  /  Alb  2.9<L>  /  TBili  0.8  /  DBili  x   /  AST  19  /  ALT  6   /  AlkPhos  46  07-12    LIVER FUNCTIONS - ( 12 Jul 2022 07:46 )  Alb: 2.9 g/dL / Pro: 4.5 g/dL / ALK PHOS: 46 U/L / ALT: 6 U/L / AST: 19 U/L / GGT: x           PT/INR - ( 10 Jul 2022 11:53 )   PT: 13.20 sec;   INR: 1.15 ratio         PTT - ( 10 Jul 2022 11:53 )  PTT:23.8 sec    A/P:   81yo Female s/p right hip orif pod 3    Dressing changed today - surgical incisions healing well c/d/i    pain control   dvt ppx  Hb 8.3 > 7.5, slightly tachycardic with HR to 100, continue to trend Hb closely  Transfuse at discretion of primary team  pt ot   wbat   med management   dispo planning            Orthopaedic Surgery Progress Note    S&E at bedside this AM. Pain well controlled. Patient walked with PT yesterday form bed to chair with rolling walker. Denies fever/chills/CP/SOB. No other complaints      T(C): 36.3 (07-12-22 @ 04:46), Max: 36.9 (07-11-22 @ 12:15)  HR: 91 (07-12-22 @ 04:46) (75 - 100)  BP: 124/71 (07-12-22 @ 04:46) (105/55 - 135/68)  RR: 18 (07-12-22 @ 04:46) (16 - 22)  SpO2: 96% (07-12-22 @ 00:19) (96% - 100%)    P/E:  Alert, NAD  Nonlabored breathing    RLE:  Spotting noted on dressing  Dressing changed, surgical incision c/d/i with staples in place  New gauze/tegaderm dressing placed  SILT sp/dp/t/sural/saph  Firing ta/ehl/fhl/gs  Foot WWP, 2+ DP pulse    Labs                        7.5    3.54  )-----------( 67       ( 12 Jul 2022 07:46 )             21.4     07-12    144  |  109  |  8<L>  ----------------------------<  90  4.2   |  24  |  0.7    Ca    8.2<L>      12 Jul 2022 07:46  Mg     1.6     07-12    TPro  4.5<L>  /  Alb  2.9<L>  /  TBili  0.8  /  DBili  x   /  AST  19  /  ALT  6   /  AlkPhos  46  07-12    LIVER FUNCTIONS - ( 12 Jul 2022 07:46 )  Alb: 2.9 g/dL / Pro: 4.5 g/dL / ALK PHOS: 46 U/L / ALT: 6 U/L / AST: 19 U/L / GGT: x           PT/INR - ( 10 Jul 2022 11:53 )   PT: 13.20 sec;   INR: 1.15 ratio         PTT - ( 10 Jul 2022 11:53 )  PTT:23.8 sec    A/P:   83yo Female s/p right hip orif pod 3    Dressing changed today - surgical incisions healing well c/d/i    pain control   dvt ppx  Hb 8.3 > 7.5, slightly tachycardic with HR to 100, continue to trend Hb closely  Transfuse at discretion of primary team  pt ot   wbat   med management   dispo planning   Patient cleared for d/c from orthopedic standpoint; patient should follow up with  in 2 weeks after discharge; patient should be discharged on appropriate dvt ppx (pt is on home anticoagulation which is sufficient)   re-contact ortho PRN

## 2022-07-12 NOTE — PROGRESS NOTE ADULT - SUBJECTIVE AND OBJECTIVE BOX
SINGLER, PA  82y, Female  Allergy: penicillin (Unknown)    Hospital Day: 4d    Patient seen and examined. No acute events overnight    PMH/PSH:  PAST MEDICAL & SURGICAL HISTORY:  Dementia      GERD (gastroesophageal reflux disease)      Breast cancer      Leaky heart valve      S/P hysterectomy      S/P left mastectomy          VITALS:  T(F): 97.8 (07-12-22 @ 14:13), Max: 98.1 (07-11-22 @ 16:00)  HR: 69 (07-12-22 @ 14:13)  BP: 117/55 (07-12-22 @ 14:13) (105/55 - 135/68)  RR: 18 (07-12-22 @ 14:13)  SpO2: 96% (07-12-22 @ 00:19)    TESTS & MEASUREMENTS:  Weight (Kg):   BMI (kg/m2): 21 (07-09)    07-10-22 @ 07:01  -  07-11-22 @ 07:00  --------------------------------------------------------  IN: 680 mL / OUT: 1920 mL / NET: -1240 mL    07-11-22 @ 07:01  -  07-12-22 @ 07:00  --------------------------------------------------------  IN: 0 mL / OUT: 1765 mL / NET: -1765 mL                            7.5    3.54  )-----------( 67       ( 12 Jul 2022 07:46 )             21.4       07-12    144  |  109  |  8<L>  ----------------------------<  90  4.2   |  24  |  0.7    Ca    8.2<L>      12 Jul 2022 07:46  Mg     1.6     07-12    TPro  4.5<L>  /  Alb  2.9<L>  /  TBili  0.8  /  DBili  x   /  AST  19  /  ALT  6   /  AlkPhos  46  07-12    LIVER FUNCTIONS - ( 12 Jul 2022 07:46 )  Alb: 2.9 g/dL / Pro: 4.5 g/dL / ALK PHOS: 46 U/L / ALT: 6 U/L / AST: 19 U/L / GGT: x                     RADIOLOGY & ADDITIONAL TESTS:    RECENT DIAGNOSTIC ORDERS:  COVID-19 PCR: Discharge  Specimen Source: Nasopharyngeal (07-12-22 @ 13:39)  Platelet Antibodies Test: 16:00 (07-12-22 @ 09:12)  Serotonin Releasing Assay: 16:00 (07-12-22 @ 09:12)      MEDICATIONS:  MEDICATIONS  (STANDING):  dextrose 5% + sodium chloride 0.45%. 1000 milliLiter(s) (80 mL/Hr) IV Continuous <Continuous>  donepezil 10 milliGRAM(s) Oral at bedtime  escitalopram 20 milliGRAM(s) Oral daily  fondaparinux Injectable 2.5 milliGRAM(s) SubCutaneous daily  hydrOXYzine  Oral Tab/Cap - Peds 10 milliGRAM(s) Oral at bedtime  lisinopril 20 milliGRAM(s) Oral daily  mirtazapine 7.5 milliGRAM(s) Oral daily  pantoprazole    Tablet 40 milliGRAM(s) Oral before breakfast  senna 2 Tablet(s) Oral at bedtime  simvastatin 20 milliGRAM(s) Oral at bedtime    MEDICATIONS  (PRN):  QUEtiapine 25 milliGRAM(s) Oral two times a day PRN agitation      HOME MEDICATIONS:  donepezil 10 mg oral tablet (07-08)  escitalopram 20 mg oral tablet (07-08)  hydrOXYzine hydrochloride 10 mg oral tablet (07-08)  pantoprazole 40 mg oral delayed release tablet (07-08)  ramipril 5 mg oral capsule (07-11)  Remeron (07-08)  simvastatin 20 mg oral tablet (07-11)  Vascepa 1 g oral capsule (07-11)      REVIEW OF SYSTEMS:  All other review of systems is negative unless indicated above.     PHYSICAL EXAM:  PHYSICAL EXAM:  GENERAL: NAD, well-developed  HEAD:  Atraumatic, Normocephalic  NECK: Supple, No JVD  CHEST/LUNG: Clear to auscultation bilaterally; No wheeze  HEART: Regular rate and rhythm; No murmurs, rubs, or gallops  ABDOMEN: Soft, Nontender, Nondistended; Bowel sounds present  EXTREMITIES:  2+ Peripheral Pulses, No clubbing, cyanosis, or edema  SKIN: No rashes or lesions

## 2022-07-12 NOTE — CONSULT NOTE ADULT - SUBJECTIVE AND OBJECTIVE BOX
HEMATOLOGY ONCOLOGY CONSULT     Patient is a 82y old  Female who presents with a chief complaint of Medical Management prior to hip fracture repair (11 Jul 2022 17:42)      HPI:   82-year old female with a past medical history of dementia (AAO x1 baseline), "leaky valve", GERD, breast cancer s/p resection in remission, rheumatic fever as a child (as per , not positive), who was bibems after being found down altered with stool and urine on b/l LE. According to , patient left the house around 8 unbeknownst to him. Around 9 he realized patient was missing, at which point he received a call from the ED. This is the first time she has left the home alone without notifying anyone. On ride to ED, patient remianed vitally stable, not given any meds. Denies any recent fever, chills, n/v, c/d, chest pains, sob, recent illness or sick contacts. Came in complaining of right hip pain.    In ED, patient received trauma work-up given her altered mental status. Vitals on presentation were /78, HR 50, RR 17, T36.9, SPO2 100% on RA. Hip xray revealed a comminuted intertrochanteric fracture of the right hip. Trauma imaging was otherwise negative for any acute pathology. Labs were significant for wbc 12.94, Ca 10.2, lactate 4.0. UA negative and blood alcohol level was wnl.    Patient was seen by Orthopedics and is scheduled for surgical repair tomorrow, 7/9/22. Is being admitted to medicine for management.    Cardiologist: Dr. Banks  Neurologist: Dr. Millan     (08 Jul 2022 14:33)       ROS:  Negative except for:    PAST MEDICAL & SURGICAL HISTORY:  Dementia      GERD (gastroesophageal reflux disease)      Breast cancer      Leaky heart valve      S/P hysterectomy      S/P left mastectomy          SOCIAL HISTORY:    FAMILY HISTORY:  Family history of colon cancer in mother        MEDICATIONS  (STANDING):  donepezil 10 milliGRAM(s) Oral at bedtime  escitalopram 20 milliGRAM(s) Oral daily  fondaparinux Injectable 2.5 milliGRAM(s) SubCutaneous daily  hydrOXYzine  Oral Tab/Cap - Peds 10 milliGRAM(s) Oral at bedtime  lisinopril 20 milliGRAM(s) Oral daily  mirtazapine 7.5 milliGRAM(s) Oral daily  pantoprazole    Tablet 40 milliGRAM(s) Oral before breakfast  QUEtiapine 25 milliGRAM(s) Oral at bedtime  simvastatin 20 milliGRAM(s) Oral at bedtime    MEDICATIONS  (PRN):  QUEtiapine 25 milliGRAM(s) Oral two times a day PRN agitation      Allergies    penicillin (Unknown)    Intolerances        Vital Signs Last 24 Hrs  T(C): 36.3 (12 Jul 2022 04:46), Max: 36.9 (11 Jul 2022 12:15)  T(F): 97.4 (12 Jul 2022 04:46), Max: 98.5 (11 Jul 2022 12:15)  HR: 91 (12 Jul 2022 04:46) (75 - 100)  BP: 124/71 (12 Jul 2022 04:46) (105/55 - 135/68)  BP(mean): 79 (11 Jul 2022 16:00) (79 - 88)  RR: 18 (12 Jul 2022 04:46) (16 - 22)  SpO2: 96% (12 Jul 2022 00:19) (96% - 100%)    Parameters below as of 12 Jul 2022 00:19  Patient On (Oxygen Delivery Method): room air        PHYSICAL EXAM:  GENERAL: NAD, well-developed  HEAD:  Atraumatic, Normocephalic  NECK: Supple, No JVD  CHEST/LUNG: Clear to auscultation bilaterally; No wheeze  HEART: Regular rate and rhythm; No murmurs, rubs, or gallops  ABDOMEN: Soft, Nontender, Nondistended; Bowel sounds present  EXTREMITIES:  2+ Peripheral Pulses, No clubbing, cyanosis, or edema  SKIN: No rashes or lesions        07-11-22 @ 07:01  -  07-12-22 @ 07:00  --------------------------------------------------------  IN: 0 mL / OUT: 1765 mL / NET: -1765 mL      LABS:                          7.5    3.54  )-----------( 67       ( 12 Jul 2022 07:46 )             21.4         Mean Cell Volume : 87.7 fL  Mean Cell Hemoglobin : 30.7 pg  Mean Cell Hemoglobin Concentration : 35.0 g/dL  Auto Neutrophil # : 2.30 K/uL  Auto Lymphocyte # : 0.72 K/uL  Auto Monocyte # : 0.30 K/uL  Auto Eosinophil # : 0.19 K/uL  Auto Basophil # : 0.01 K/uL  Auto Neutrophil % : 64.9 %  Auto Lymphocyte % : 20.3 %  Auto Monocyte % : 8.5 %  Auto Eosinophil % : 5.4 %  Auto Basophil % : 0.3 %      07-12    144  |  109  |  8<L>  ----------------------------<  90  4.2   |  24  |  0.7    Ca    8.2<L>      12 Jul 2022 07:46  Mg     1.6     07-12    TPro  4.5<L>  /  Alb  2.9<L>  /  TBili  0.8  /  DBili  x   /  AST  19  /  ALT  6   /  AlkPhos  46  07-12      PT/INR - ( 10 Jul 2022 11:53 )   PT: 13.20 sec;   INR: 1.15 ratio         PTT - ( 10 Jul 2022 11:53 )  PTT:23.8 sec                BLOOD SMEAR INTERPRETATION:   Complete Blood Count + Automated Diff in AM (07.12.22 @ 07:46)   WBC Count: 3.54 K/uL   RBC Count: 2.44 M/uL   Hemoglobin: 7.5 g/dL   Hematocrit: 21.4 %   Mean Cell Volume: 87.7 fL   Mean Cell Hemoglobin: 30.7 pg   Mean Cell Hemoglobin Conc: 35.0 g/dL   Red Cell Distrib Width: 14.1 %   Platelet Count - Automated: 67 K/uL   Auto Neutrophil #: 2.30 K/uL   Auto Lymphocyte #: 0.72 K/uL   Auto Monocyte #: 0.30 K/uL   Auto Eosinophil #: 0.19 K/uL   Auto Basophil #: 0.01 K/uL   Auto Neutrophil %: 64.9: Differential percentages must be correlated with absolute numbers for   clinical significance. %   Auto Lymphocyte %: 20.3 %   Auto Monocyte %: 8.5 %   Auto Eosinophil %: 5.4 %   Auto Basophil %: 0.3 %   Auto Immature Granulocyte %: 0.6: (Includes meta, myelo and promyelocytes) %   Nucleated RBC: 0 /100 WBCs       RADIOLOGY & ADDITIONAL STUDIES:  < from: VA Duplex Lower Ext Vein Scan, Bilat (07.11.22 @ 14:19) >  IMPRESSION:  No evidence of deep venous thrombosis in either lower extremity.    < end of copied text >  < from: CT Abdomen and Pelvis w/ IV Cont (07.08.22 @ 12:51) >  Acute comminuted right femur intertrochanteric hip fracture.    No acute traumatic abnormality within the chest abdomen and pelvis.      < end of copied text >  < from: CT Hip No Cont, Right (07.08.22 @ 12:47) >  Acute comminuted right femoral intertrochanteric fracture with   subtrochanteric extension demonstrating displacement and angulation as   above.    < end of copied text >  < from: CT Cervical Spine No Cont (07.08.22 @ 12:47) >  No acute fracture or subluxation.      < end of copied text >

## 2022-07-12 NOTE — PROGRESS NOTE ADULT - ASSESSMENT
82-yo female with a pmh of dementia (AAO x1 baseline), GERD, breast cancer s/p mastectomy in remission, who was bibems after being found down altered with right hip pain. Patient scheduled for repair of hip fracture with Ortho Saturday, being admitted to medicine for management.    # R' Hip fracture s/p orif 07/09  - Pain control  - activity per Ortho  - monitor for signs of Hematoma  post op care as per the event / ortho  No endocarditis prophylaxis    #) Acute blood loss anemia  #Thrombocytopenia  Unclear etiology for low pltls  Unlikely DIC, fibrinogen wnl  s/p 1U PRBC  Hg 7.5 today  - Monitor CBC BID  - On fondaparinux for DVT PPX  - Hemodynamically stable  - Hematology eval requested  - f/u iron studies, b12/folate, HIT ab, serotonin releasing assay    #Delirium  Likely post-op and hospital acquired. Pt has not been getting any sleep in the PACU  Pt drowsy today. Was able to work with PT. Will DC standing seroquel and keep PRN  - Seroquel PRN    #Unwitnessed fall  # hx of dementia  r/o cardiac arrhythmia, syncope workup  Echo 07/10: 60-65% EF  No events noted on telemetry  - Can monitor off telemetry  - trauma imaging showed no acute intracranial pathology  - wbc 12.94 w/ lactate 4.0 on presentation s/p bolus in ED  - c/w with home medications: donepezil  - PT    # GERD  - Protonix 40 QD    DVT PPX, Lovenox    #Progress Note Handoff  Pending (specify): hematology eval, PT eval, Dispo  Family discussion: d/w family regarding eval for anemia/thrombocytopenia, dc pending if stable  Disposition: STR

## 2022-07-13 ENCOUNTER — TRANSCRIPTION ENCOUNTER (OUTPATIENT)
Age: 82
End: 2022-07-13

## 2022-07-13 VITALS
HEART RATE: 82 BPM | TEMPERATURE: 99 F | RESPIRATION RATE: 18 BRPM | DIASTOLIC BLOOD PRESSURE: 56 MMHG | SYSTOLIC BLOOD PRESSURE: 111 MMHG

## 2022-07-13 LAB
ANION GAP SERPL CALC-SCNC: 10 MMOL/L — SIGNIFICANT CHANGE UP (ref 7–14)
BASOPHILS # BLD AUTO: 0.02 K/UL — SIGNIFICANT CHANGE UP (ref 0–0.2)
BASOPHILS NFR BLD AUTO: 0.5 % — SIGNIFICANT CHANGE UP (ref 0–1)
BUN SERPL-MCNC: 10 MG/DL — SIGNIFICANT CHANGE UP (ref 10–20)
CALCIUM SERPL-MCNC: 8 MG/DL — LOW (ref 8.5–10.1)
CHLORIDE SERPL-SCNC: 104 MMOL/L — SIGNIFICANT CHANGE UP (ref 98–110)
CO2 SERPL-SCNC: 24 MMOL/L — SIGNIFICANT CHANGE UP (ref 17–32)
CREAT SERPL-MCNC: 0.6 MG/DL — LOW (ref 0.7–1.5)
EGFR: 90 ML/MIN/1.73M2 — SIGNIFICANT CHANGE UP
EOSINOPHIL # BLD AUTO: 0.24 K/UL — SIGNIFICANT CHANGE UP (ref 0–0.7)
EOSINOPHIL NFR BLD AUTO: 5.4 % — SIGNIFICANT CHANGE UP (ref 0–8)
FERRITIN SERPL-MCNC: 239 NG/ML — HIGH (ref 15–150)
FOLATE SERPL-MCNC: >20 NG/ML — SIGNIFICANT CHANGE UP
GLUCOSE SERPL-MCNC: 117 MG/DL — HIGH (ref 70–99)
HCT VFR BLD CALC: 22.2 % — LOW (ref 37–47)
HGB BLD-MCNC: 7.7 G/DL — LOW (ref 12–16)
IMM GRANULOCYTES NFR BLD AUTO: 0.5 % — HIGH (ref 0.1–0.3)
LYMPHOCYTES # BLD AUTO: 0.98 K/UL — LOW (ref 1.2–3.4)
LYMPHOCYTES # BLD AUTO: 22.2 % — SIGNIFICANT CHANGE UP (ref 20.5–51.1)
MAGNESIUM SERPL-MCNC: 1.6 MG/DL — LOW (ref 1.8–2.4)
MCHC RBC-ENTMCNC: 30.3 PG — SIGNIFICANT CHANGE UP (ref 27–31)
MCHC RBC-ENTMCNC: 34.7 G/DL — SIGNIFICANT CHANGE UP (ref 32–37)
MCV RBC AUTO: 87.4 FL — SIGNIFICANT CHANGE UP (ref 81–99)
MONOCYTES # BLD AUTO: 0.37 K/UL — SIGNIFICANT CHANGE UP (ref 0.1–0.6)
MONOCYTES NFR BLD AUTO: 8.4 % — SIGNIFICANT CHANGE UP (ref 1.7–9.3)
NEUTROPHILS # BLD AUTO: 2.78 K/UL — SIGNIFICANT CHANGE UP (ref 1.4–6.5)
NEUTROPHILS NFR BLD AUTO: 63 % — SIGNIFICANT CHANGE UP (ref 42.2–75.2)
NRBC # BLD: 0 /100 WBCS — SIGNIFICANT CHANGE UP (ref 0–0)
PLATELET # BLD AUTO: 76 K/UL — LOW (ref 130–400)
POTASSIUM SERPL-MCNC: 3.4 MMOL/L — LOW (ref 3.5–5)
POTASSIUM SERPL-SCNC: 3.4 MMOL/L — LOW (ref 3.5–5)
RBC # BLD: 2.54 M/UL — LOW (ref 4.2–5.4)
RBC # FLD: 13.9 % — SIGNIFICANT CHANGE UP (ref 11.5–14.5)
SARS-COV-2 RNA SPEC QL NAA+PROBE: SIGNIFICANT CHANGE UP
SODIUM SERPL-SCNC: 138 MMOL/L — SIGNIFICANT CHANGE UP (ref 135–146)
VIT B12 SERPL-MCNC: 1416 PG/ML — HIGH (ref 232–1245)
WBC # BLD: 4.41 K/UL — LOW (ref 4.8–10.8)
WBC # FLD AUTO: 4.41 K/UL — LOW (ref 4.8–10.8)

## 2022-07-13 PROCEDURE — 99232 SBSQ HOSP IP/OBS MODERATE 35: CPT

## 2022-07-13 RX ORDER — POTASSIUM CHLORIDE 20 MEQ
20 PACKET (EA) ORAL
Refills: 0 | Status: COMPLETED | OUTPATIENT
Start: 2022-07-13 | End: 2022-07-13

## 2022-07-13 RX ORDER — FONDAPARINUX SODIUM 2.5 MG/.5ML
2.5 INJECTION, SOLUTION SUBCUTANEOUS
Qty: 0 | Refills: 0 | DISCHARGE
Start: 2022-07-13 | End: 2022-07-27

## 2022-07-13 RX ADMIN — Medication 20 MILLIEQUIVALENT(S): at 18:15

## 2022-07-13 RX ADMIN — LISINOPRIL 20 MILLIGRAM(S): 2.5 TABLET ORAL at 06:07

## 2022-07-13 RX ADMIN — Medication 10 MILLIGRAM(S): at 00:28

## 2022-07-13 RX ADMIN — FONDAPARINUX SODIUM 2.5 MILLIGRAM(S): 2.5 INJECTION, SOLUTION SUBCUTANEOUS at 13:28

## 2022-07-13 RX ADMIN — MIRTAZAPINE 7.5 MILLIGRAM(S): 45 TABLET, ORALLY DISINTEGRATING ORAL at 13:28

## 2022-07-13 RX ADMIN — ESCITALOPRAM OXALATE 20 MILLIGRAM(S): 10 TABLET, FILM COATED ORAL at 13:28

## 2022-07-13 RX ADMIN — PANTOPRAZOLE SODIUM 40 MILLIGRAM(S): 20 TABLET, DELAYED RELEASE ORAL at 06:07

## 2022-07-13 RX ADMIN — Medication 20 MILLIEQUIVALENT(S): at 16:30

## 2022-07-13 NOTE — CHART NOTE - NSCHARTNOTEFT_GEN_A_CORE
PACU ANESTHESIA ADMISSION NOTE      Procedure: ORIF of femur using Gamma nail  intertrochanteric hip fracture, CPT code 76597      Post op diagnosis:  Closed intertrochanteric fracture of right hip        ____  Intubated  TV:______       Rate: ______      FiO2: ______    _x___  Patent Airway    _x___  Full return of protective reflexes    _x___  Full recovery from anesthesia / back to baseline status    Vitals:  T(F): 98   HR: 123  BP: 115/79  RR: 14  SpO2: 94%    Mental Status:  _x___ Awake   __x___ Alert   _____ Drowsy   _____ Sedated    Nausea/Vomiting:  _x___  NO       ______Yes,   See Post - Op Orders         Pain Scale (0-10):  __0___    Treatment: _x___ None    ____ See Post - Op/PCA Orders    Post - Operative Fluids:   __x__ Oral   ____ See Post - Op Orders    Plan: Discharge:   ____Home       ___x__Floor     _____Critical Care    _____  Other:_________________    Comments:  No anesthesia issues or complications noted.  Discharge when criteria met.
Pt reported urinating when PT came to walk her. No record of urination was made. Case discussed with senior, who agreed pt can leave.
dimers elevated. f/U US duplex LE
Tertiary Trauma Survey (TTS)    Date of TTS: 22 @ 11:55   Admit Date: 22  Trauma Activation: ALERT   Admit GCS:    14    E-  4   V-4     M- 6    HPI:  (Patient has dementia/is poor historian; hisotry gathered from  and EMS via ED note)    Patient is an 82-year old female with a past medical history of dementia (AAO x1 baseline), "leaky valve", GERD, breast cancer s/p resection in remission, rheumatic fever as a child (as per , not positive), who was bibems after being found down altered with stool and urine on b/l LE. According to , patient left the house around 8 unbeknownst to him. Around 9 he realized patient was missing, at which point he received a call from the ED. This is the first time she has left the home alone without notifying anyone. On ride to ED, patient remianed vitally stable, not given any meds. Denies any recent fever, chills, n/v, c/d, chest pains, sob, recent illness or sick contacts. Came in complaining of right hip pain.    In ED, patient received trauma work-up given her altered mental status. Vitals on presentation were /78, HR 50, RR 17, T36.9, SPO2 100% on RA. Hip xray revealed a comminuted intertrochanteric fracture of the right hip. Trauma imaging was otherwise negative for any acute pathology. Labs were significant for wbc 12.94, Ca 10.2, lactate 4.0. UA negative and blood alcohol level was wnl.    Patient was seen by Orthopedics and is scheduled for surgical repair tomorrow, 22. Is being admitted to medicine for management.    Cardiologist: Dr. Banks  Neurologist: Dr. Millan     (2022 14:33)    Patient seen and examined.     24 hour trauma tertiary survey was performed. No new traumatic injuries found.     PHYSICAL EXAM:  General: NAD  HEENT: Normocephalic, atraumatic, EOMI, PEERLA.  Neck: Soft, midline trachea.  Chest: No chest wall tenderness.   Cardiac: S1, S2, afib on monitor  Respiratory: Bilateral breath sounds, clear and equal bilaterally  Abdomen: Soft, non-distended, non-tender, no rebound, no guarding, no masses palpated  Groin: Normal appearing  Ext: palp radial b/l UE, b/l DP palp in Lower Extrem, motor and sensory grossly intact in all 4 extremities  Back: no TTP, no palpable runoff/stepoff/deformity    Vital Signs Last 24 Hrs  T(C): 36.5 (2022 08:27), Max: 37.1 (2022 00:25)  T(F): 97.7 (2022 08:27), Max: 98.8 (2022 00:25)  HR: 85 (2022 08:27) (55 - 85)  BP: 122/58 (2022 08:27) (122/58 - 145/62)  BP(mean): --  RR: 18 (2022 08:27) (17 - 18)  SpO2: 98% (2022 08:27) (97% - 99%)    Parameters below as of 2022 08:27  Patient On (Oxygen Delivery Method): room air        Labs:  CAPILLARY BLOOD GLUCOSE                              13.4   12.94 )-----------( 141      ( 2022 10:44 )             38.2         07-08    143  |  104  |  18  ----------------------------<  156<H>  4.5   |  25  |  1.1          LFTs:             6.6  | 0.7  | 22       ------------------[70      ( 2022 10:44 )  4.5  | x    | 11          Lipase:39     Amylase:x         Lactate, Blood: 4.0 mmol/L (22 @ 10:44)      Coags:     13.80  ----< 1.20    ( 2022 05:56 )     30.4        CARDIAC MARKERS ( 2022 10:44 )  x     / <0.01 ng/mL / 41 U/L / x     / x              Urinalysis Basic - ( 2022 11:50 )    Color: Yellow / Appearance: Clear / S.011 / pH: x  Gluc: x / Ketone: Negative  / Bili: Negative / Urobili: <2 mg/dL   Blood: x / Protein: Negative / Nitrite: Negative   Leuk Esterase: Negative / RBC: x / WBC x   Sq Epi: x / Non Sq Epi: x / Bacteria: x      RADIOLOGICAL FINDINGS REVIEW:  CXR:< from: Xray Chest 1 View- PORTABLE-Urgent (22 @ 12:25) >  No radiographic evidence of acute cardiopulmonary disease.    Extremity Films:< from: Xray Hip 2-3 Views, Right (22 @ 12:25) >  The patient has a comminuted intertrochanteric fracture of the right hip   with varus angulation.      C-Spine CT:< from: CT Cervical Spine No Cont (22 @ 12:47) >  CT HEAD:  No acute intracranial pathology or hemorrhage. No acute calvarial   fracture.  Mild chronic microvascular type changes.    CT CERVICAL SPINE:  No acute fracture or subluxation.      Chest CT:< from: CT Chest w/ IV Cont (22 @ 12:51) >  Acute comminuted right femur intertrochanteric hip fracture.  No acute traumatic abnormality within the chest abdomen and pelvis.      ABD/Pelvis CT: < from: CT Abdomen and Pelvis w/ IV Cont (22 @ 12:51) >  Acute comminuted right femur intertrochanteric hip fracture.  No acute traumatic abnormality within the chest abdomen and pelvis.      < from: CT Hip No Cont, Right (22 @ 12:47) >  Acute comminuted right femoral intertrochanteric fracture with   subtrochanteric extension demonstrating displacement and angulation as   above.      ASSESSMENT/   This is a 82y Female with PMH as above, presenting as a Trauma Alert, GCS 14, AAOx2, BOB, with complaint of right thigh pain , external signs of trauma include right hip tenderness.    Injuries:  - Acute Comminuted Intertrochanteric fracture of right hip    PLAN:  - All images/reports reviewed.   - Patient is stable from trauma standpoint.   - No further traumatic intervention needed   - Please recall as needed [phone]

## 2022-07-13 NOTE — DISCHARGE NOTE PROVIDER - ATTENDING DISCHARGE PHYSICAL EXAMINATION:
PHYSICAL EXAM  GEN: no distress, comfortable  PULM: BS heard b/l equal, No wheezing  CVS: S1S2 present, no rubs or gallops  ABD: Soft, non-distended, no guarding; non-tender  EXT: No lower extremity edema  NEURO: Awake and alert

## 2022-07-13 NOTE — PROGRESS NOTE ADULT - REASON FOR ADMISSION
Medical Management prior to hip fracture repair

## 2022-07-13 NOTE — DISCHARGE NOTE PROVIDER - PROVIDER TOKENS
PROVIDER:[TOKEN:[[id number]],FOLLOWUP:[1 week]],PROVIDER:[TOKEN:[[id number]],FOLLOWUP:[1 week]],PROVIDER:[TOKEN:[[id number]],FOLLOWUP:[2 weeks]]

## 2022-07-13 NOTE — DISCHARGE NOTE NURSING/CASE MANAGEMENT/SOCIAL WORK - PATIENT PORTAL LINK FT
You can access the FollowMyHealth Patient Portal offered by [facility] by registering at the following website: [email or web address]. By joining [facility]’s FollowMyHealth portal, you will also be able to view your health information using other applications (apps) compatible with our system.

## 2022-07-13 NOTE — PROGRESS NOTE ADULT - ASSESSMENT
1- Breast cancer MICHAEL  not in tx now    2 s/p hip fx and s/p ORIF.  recovering    3 anemia hb 7 .. postfx ..stable  continue to monitor  anemia w/p with ferr, b12, ret count  transf prn    4 thrombocytopenia ..stable  HIT a/w lovenox   on fundaparinaux  not active bleeding     5 agree with d/c planning

## 2022-07-13 NOTE — DISCHARGE NOTE PROVIDER - NSDCMRMEDTOKEN_GEN_ALL_CORE_FT
donepezil 10 mg oral tablet: 1 tab(s) orally once a day (at bedtime)  escitalopram 20 mg oral tablet: 1 tab(s) orally once a day  hydrOXYzine hydrochloride 10 mg oral tablet: 10 milligram(s) orally once a day (at bedtime)  pantoprazole 40 mg oral delayed release tablet: 1 tab(s) orally once a day  ramipril 5 mg oral capsule: 1 cap(s) orally once a day  Remeron: 7.5 milligram(s) orally once a day  simvastatin 20 mg oral tablet: 1 tab(s) orally once a day (at bedtime)  Vascepa 1 g oral capsule: 2 cap(s) orally 2 times a day   aspirin 81 mg oral capsule: 1 cap(s) orally 2 times a day for 2 weeks starting from 7/28 to 8/11  donepezil 10 mg oral tablet: 1 tab(s) orally once a day (at bedtime)  escitalopram 20 mg oral tablet: 1 tab(s) orally once a day  fondaparinux: 2.5 milligram(s) subcutaneous once a day for 2 weeks untill 7/27  hydrOXYzine hydrochloride 10 mg oral tablet: 10 milligram(s) orally once a day (at bedtime)  pantoprazole 40 mg oral delayed release tablet: 1 tab(s) orally once a day  ramipril 5 mg oral capsule: 1 cap(s) orally once a day  Remeron: 7.5 milligram(s) orally once a day  simvastatin 20 mg oral tablet: 1 tab(s) orally once a day (at bedtime)  Vascepa 1 g oral capsule: 2 cap(s) orally 2 times a day

## 2022-07-13 NOTE — PROGRESS NOTE ADULT - PROVIDER SPECIALTY LIST ADULT
Heme/Onc
Internal Medicine
Orthopedics
Orthopedics
Hospitalist
Hospitalist
Internal Medicine
Orthopedics
Cardiology

## 2022-07-13 NOTE — DISCHARGE NOTE PROVIDER - CARE PROVIDERS DIRECT ADDRESSES
,DirectAddress_Unknown,DirectAddress_Unknown,[email or web address]

## 2022-07-13 NOTE — PROGRESS NOTE ADULT - SUBJECTIVE AND OBJECTIVE BOX
HPI:  Patient is a 82y old  Female with hx of breast cancer MICHAEL admitted after hip fracture repair   after fall. Pt underwent a right hip ORIF and recovering now.  In course of hospital, pt has developed anemia post fx at hb 7 and lowered plt count .  It is suspected to be HIT and has no active bleeding   She is in the bed supine in o distress, she is known for the dementia,  at the bedside.  Known for the mild CKD, GERD, high lipid, dementia and mild moderate aortic valve regurgitation  In the last 1 year lost significant weight her BP and lipid panel dropped to a normal range    HOME MED: Ramipril 10, Simvastatin 10, Lansoprazole 15, Hydroxyzine, Escitalopram, Calcium, Omega 3         PAST MEDICAL & SURGICAL HISTORY:  Dementia      GERD (gastroesophageal reflux disease)      Breast cancer      Leaky heart valve      S/P hysterectomy      S/P left mastectomy          PREVIOUS DIAGNOSTIC TESTING:      ECHO  FINDINGS: in the office: EF 50-55%, mild grade 1 diastolic dysfunction, PA pressure 27 mmHg, mild moderate AI, trace TR    STRESS TEST  FINDINGS:    CATHETERIZATION  FINDINGS:    MEDICATIONS  (STANDING):  enoxaparin Injectable 40 milliGRAM(s) SubCutaneous every 24 hours  lactated ringers. 1000 milliLiter(s) (75 mL/Hr) IV Continuous <Continuous>  pantoprazole    Tablet 40 milliGRAM(s) Oral before breakfast    MEDICATIONS  (PRN):      FAMILY HISTORY:  Family history of colon cancer in mother        SOCIAL HISTORY:  CIGARETTES: no  ALCOHOL: no  DRUGS: no                        PHYSICAL EXAM:    VSS  GENERAL: No distress, slender geriatric patient in supine in no respiratory distress  HEAD:  Atraumatic, Normocephalic  NECK: Supple, No JVD, No Bruit of either carotid arteries  NERVOUS SYSTEM:  Alert, Awake, not Oriented, severe dementia, memory loss  CHEST/LUNG: Normal air entry to lung base bilaterally; No wheeze, crackle, rales, rhonchi  HEART: Regular heart beat, S1, A2, P2, No S3, No gallop, 2/4 diastolic aortic murmur at the left sternal border  ABDOMEN: Soft, Non tender, Non distended; Bowel sounds present  EXTREMITIES:  2+ Peripheral Pulses, No edema  SKIN: No rashes or lesions        LABS:                        13.4   12.94 )-----------( 141      ( 08 Jul 2022 10:44 )             38.2   >>> hb 7   >>> plt 70's    07-08    143  |  104  |  18  ----------------------------<  156<H>  4.5   |  25  |  1.1    Ca    10.2<H>      08 Jul 2022 10:44    TPro  6.6  /  Alb  4.5  /  TBili  0.7  /  DBili  x   /  AST  22  /  ALT  11  /  AlkPhos  70  07-08    CARDIAC MARKERS ( 08 Jul 2022 10:44 )  x     / <0.01 ng/mL / 41 U/L / x     / x          PT/INR - ( 08 Jul 2022 10:44 )   PT: 12.20 sec;   INR: 1.06 ratio         PTT - ( 08 Jul 2022 10:44 )  PTT:29.1 sec  Urinalysis Basic - ( 08 Jul 2022 11:50 )            No acute traumatic abnormality within the chest abdomen and pelvis.    < end of copied text >

## 2022-07-13 NOTE — DISCHARGE NOTE NURSING/CASE MANAGEMENT/SOCIAL WORK - NSDCPEFALRISK_GEN_ALL_CORE
For information on Fall & Injury Prevention, visit: [email or web address] OR  [email or web address] OR  https://www.cdc.gov/steadi/patient.html

## 2022-07-13 NOTE — DISCHARGE NOTE PROVIDER - NSDCCPCAREPLAN_GEN_ALL_CORE_FT
PRINCIPAL DISCHARGE DIAGNOSIS  Diagnosis: Fracture, intertrochanteric, right femur  Assessment and Plan of Treatment: Fracture  You had a fracture of intertrochanteric fracture of right femur and treated for the same. Follow-up with orthopedician Dr. Lozano in 2 weeks  A fracture is a break in one of your bones. This can occur from a variety of injuries, especially traumatic ones. Symptoms include pain, bruising, or swelling. Do not use the injured limb. If a fracture is in one of the bones below your waist, do not put weight on that limb unless instructed to do so by your healthcare provider. Crutches or a cane may have been provided. A splint or cast may have been applied by your health care provider. Make sure to keep it dry and follow up with an orthopedist as instructed.  SEEK IMMEDIATE MEDICAL CARE IF YOU HAVE ANY OF THE FOLLOWING SYMPTOMS: numbness, tingling, increasing pain, or weakness in any part of the injured limb.        SECONDARY DISCHARGE DIAGNOSES  Diagnosis: Anemia  Assessment and Plan of Treatment: Anemia  Follow up with Dr. Dewitt as an outpatient for workup of anemia.  Anemia is a condition in which the concentration of red blood cells or hemoglobin in the blood is below normal. Hemoglobin is a substance in red blood cells that carries oxygen to the tissues of the body. Anemia results in not enough oxygen reaching these tissues which can cause symptoms such as weakness, dizziness/lightheadedness, shortness of breath, chest pain, paleness, or nausea. The cause of your anemia may or may not be determined immediately. If your hemoglobin was dangerously low, you may have received a blood transfusion. Usually reactions to transfusions occur immediately but monitor yourself for any fevers, rash, or shortness of breath.  SEEK IMMEDIATE MEDICAL CARE IF YOU HAVE ANY OF THE FOLLOWING SYMPTOMS: extreme weakness/chest pain/shortness of breath, black or bloody stools, vomiting blood, fainting, fever, or any signs of dehydration.      Diagnosis: Thrombocytopenic  Assessment and Plan of Treatment: You have low platelet count. Follow-up with hematologist as outpatient for the workup of same.

## 2022-07-13 NOTE — DISCHARGE NOTE PROVIDER - CARE PROVIDER_API CALL
Ana Dewitt)  Hematology; Medical Oncology  [address] [place] [address]  Phone: [phone]  Fax: [phone]  Follow Up Time: 1 week    SHAHRZAD FUNEZ  Pediatrics  [facility] [place] [address] [place] [address]  Phone: [phone]  Fax: ()-  Follow Up Time: 1 week    Harshal Lozano)  Orthopaedic Surgery  [address] [place] [address]  Phone: [phone]  Fax: [phone]  Follow Up Time: 2 weeks

## 2022-07-13 NOTE — PROGRESS NOTE ADULT - ASSESSMENT
1- Breast cancer MICHAEL  not in tx now    2 s/p hip fx and s/p ORIF.  recovering    3 anemia hb 7 .. postfx ..stable  continue to monitor    4 thrombocytopenia ..stable  HIT a/w lovenox   on fundarinaux  not active bleeding     5 agree with d/c planning

## 2022-07-13 NOTE — PROGRESS NOTE ADULT - SUBJECTIVE AND OBJECTIVE BOX
HPI:  Patient is a 82y old  Female with hx of breast cancer MICHAEL admitted after hip fracture repair   after fall. Pt underwent a right hip ORIF and recovering now.  In course of hospital, pt has developed anemia post fx at hb 7 and lowered plt count .  It is suspected to be HIT and has no active bleeding   She is in the bed supine in o distress, she is known for the dementia,  at the bedside.  Known for the mild CKD, GERD, high lipid, dementia and mild moderate aortic valve regurgitation  In the last 1 year lost significant weight her BP and lipid panel dropped to a normal range    HOME MED: Ramipril 10, Simvastatin 10, Lansoprazole 15, Hydroxyzine, Escitalopram, Calcium, Omega 3         PAST MEDICAL & SURGICAL HISTORY:  Dementia      GERD (gastroesophageal reflux disease)      Breast cancer      Leaky heart valve      S/P hysterectomy      S/P left mastectomy          PREVIOUS DIAGNOSTIC TESTING:      ECHO  FINDINGS: in the office: EF 50-55%, mild grade 1 diastolic dysfunction, PA pressure 27 mmHg, mild moderate AI, trace TR    STRESS TEST  FINDINGS:    CATHETERIZATION  FINDINGS:    MEDICATIONS  (STANDING):  enoxaparin Injectable 40 milliGRAM(s) SubCutaneous every 24 hours  lactated ringers. 1000 milliLiter(s) (75 mL/Hr) IV Continuous <Continuous>  pantoprazole    Tablet 40 milliGRAM(s) Oral before breakfast    MEDICATIONS  (PRN):      FAMILY HISTORY:  Family history of colon cancer in mother        SOCIAL HISTORY:  CIGARETTES: no  ALCOHOL: no  DRUGS: no                        PHYSICAL EXAM:    VSS  GENERAL: No distress, slender geriatric patient in supine in no respiratory distress  HEAD:  Atraumatic, Normocephalic  NECK: Supple, No JVD, No Bruit of either carotid arteries  NERVOUS SYSTEM:  Alert, Awake, not Oriented, severe dementia, memory loss  CHEST/LUNG: Normal air entry to lung base bilaterally; No wheeze, crackle, rales, rhonchi  HEART: Regular heart beat, S1, A2, P2, No S3, No gallop, 2/4 diastolic aortic murmur at the left sternal border  ABDOMEN: Soft, Non tender, Non distended; Bowel sounds present  EXTREMITIES:  2+ Peripheral Pulses, No edema  SKIN: No rashes or lesions        LABS:                        13.4   12.94 )-----------( 141      ( 08 Jul 2022 10:44 )             38.2   >>> hb 7   >>> plt   07-08    143  |  104  |  18  ----------------------------<  156<H>  4.5   |  25  |  1.1    Ca    10.2<H>      08 Jul 2022 10:44    TPro  6.6  /  Alb  4.5  /  TBili  0.7  /  DBili  x   /  AST  22  /  ALT  11  /  AlkPhos  70  07-08    CARDIAC MARKERS ( 08 Jul 2022 10:44 )  x     / <0.01 ng/mL / 41 U/L / x     / x          PT/INR - ( 08 Jul 2022 10:44 )   PT: 12.20 sec;   INR: 1.06 ratio         PTT - ( 08 Jul 2022 10:44 )  PTT:29.1 sec  Urinalysis Basic - ( 08 Jul 2022 11:50 )            No acute traumatic abnormality within the chest abdomen and pelvis.    < end of copied text >

## 2022-07-13 NOTE — DISCHARGE NOTE PROVIDER - HOSPITAL COURSE
82-yo female with a pmh of dementia (AAO x1 baseline), GERD, breast cancer s/p mastectomy in remission, who was bibems after being found down altered with right hip pain. Patient scheduled for repair of hip fracture with Ortho Saturday, being admitted to medicine for management. ORIF of right intertrochanteric fracture done on 7/9. Patient will Follow up as outpatient. Patient was also worked up for anemia(7.7 stable at discharge 7/13) and thrombocytopenia(71 stable at discharge). Hematology consulted, Dr. Dewitt to follow-up the patient as outpatient. Anemia most probably from surgery and thrombocytopenia improving after changed heparin to fondapaurinax.  P Foleys catheter was removed patient passed trial of void. Patient worked with PT. Patient will be discharged to [facility].   82-yo female with a pmh of dementia (AAO x1 baseline), GERD, breast cancer s/p mastectomy in remission, who was bibems after being found down altered with right hip pain. Patient scheduled for repair of hip fracture with Ortho Saturday, being admitted to medicine for management. ORIF of right intertrochanteric fracture done on 7/9. Patient will Follow up as outpatient. Patient was also worked up for anemia(7.7 stable at discharge 7/13) and thrombocytopenia(71 stable at discharge). Hematology consulted, Dr. Dewitt to follow-up the patient as outpatient. Anemia most probably from surgery and thrombocytopenia improving after changed heparin to fondapaurinax.  P Foleys catheter was removed patient passed trial of void. Patient worked with PT. Patient will be discharged to [facility].    # Right Hip fracture s/p ORIF 07/09  ortho following  DVT px per recommendation from orthopedics- fondaprinux for 2 weeks and aspirin for 2 weeks    #  Acute blood loss anemia- stable  #Thrombocytopenia-possible HIT; stable and improving  lovenox switched to fondaparinux  hematology evaluated and cleared for discharge  Follow up as OP    #Delirium  Likely post-op and hospital acquired. improved    #Unwitnessed fall  # hx of dementia  Echo 07/10: 60-65% EF  No events noted on telemetry per notes    # GERD  - Protonix 40 QD

## 2022-07-14 LAB
HEPARIN-PF4 AB RESULT: <0.6 U/ML — SIGNIFICANT CHANGE UP (ref 0–0.9)
PF4 HEPARIN CMPLX AB SER-ACNC: NEGATIVE — SIGNIFICANT CHANGE UP

## 2022-07-15 ENCOUNTER — APPOINTMENT (OUTPATIENT)
Dept: NEUROLOGY | Facility: CLINIC | Age: 82
End: 2022-07-15

## 2022-07-15 LAB
GLYCOPROTEIN IV ANTIBODY: NEGATIVE — SIGNIFICANT CHANGE UP
HLA AB SER QL IA: NEGATIVE — SIGNIFICANT CHANGE UP
PLAT GP IA/IIA AB SER QL IA: NEGATIVE — SIGNIFICANT CHANGE UP
PLAT GP IB/IX AB SER QL IA: NEGATIVE — SIGNIFICANT CHANGE UP
PLAT GP IIB/IIIA AB SER QL IA: NEGATIVE — SIGNIFICANT CHANGE UP
SRA INTERP SER-IMP: SIGNIFICANT CHANGE UP

## 2022-07-16 LAB — SRA INTERP SER-IMP: SIGNIFICANT CHANGE UP

## 2022-07-20 DIAGNOSIS — I35.1 NONRHEUMATIC AORTIC (VALVE) INSUFFICIENCY: ICD-10-CM

## 2022-07-20 DIAGNOSIS — R00.1 BRADYCARDIA, UNSPECIFIED: ICD-10-CM

## 2022-07-20 DIAGNOSIS — N18.9 CHRONIC KIDNEY DISEASE, UNSPECIFIED: ICD-10-CM

## 2022-07-20 DIAGNOSIS — D62 ACUTE POSTHEMORRHAGIC ANEMIA: ICD-10-CM

## 2022-07-20 DIAGNOSIS — R41.0 DISORIENTATION, UNSPECIFIED: ICD-10-CM

## 2022-07-20 DIAGNOSIS — K21.9 GASTRO-ESOPHAGEAL REFLUX DISEASE WITHOUT ESOPHAGITIS: ICD-10-CM

## 2022-07-20 DIAGNOSIS — D75.82 HEPARIN INDUCED THROMBOCYTOPENIA (HIT): ICD-10-CM
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
## 2022-07-20 DIAGNOSIS — F03.90 UNSPECIFIED DEMENTIA WITHOUT BEHAVIORAL DISTURBANCE: ICD-10-CM
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
## 2022-07-20 DIAGNOSIS — Z88.0 ALLERGY STATUS TO PENICILLIN: ICD-10-CM

## 2022-07-20 DIAGNOSIS — S72.141A DISPLACED INTERTROCHANTERIC FRACTURE OF RIGHT FEMUR, INITIAL ENCOUNTER FOR CLOSED FRACTURE: ICD-10-CM

## 2022-07-28 RX ORDER — ASPIRIN/CALCIUM CARB/MAGNESIUM 324 MG
1 TABLET ORAL
Qty: 28 | Refills: 0
Start: 2022-07-28 | End: 2022-08-10

## 2022-08-10 RX ORDER — SIMVASTATIN 20 MG/1
1 TABLET, FILM COATED ORAL
Qty: 0 | Refills: 0 | DISCHARGE

## 2022-08-10 RX ORDER — PANTOPRAZOLE SODIUM 20 MG/1
1 TABLET, DELAYED RELEASE ORAL
Qty: 0 | Refills: 0 | DISCHARGE

## 2022-08-10 RX ORDER — ESCITALOPRAM OXALATE 10 MG/1
1 TABLET, FILM COATED ORAL
Qty: 0 | Refills: 0 | DISCHARGE

## 2022-08-10 RX ORDER — RAMIPRIL 5 MG
1 CAPSULE ORAL
Qty: 0 | Refills: 0 | DISCHARGE

## 2022-09-21 PROBLEM — F03.90 UNSPECIFIED DEMENTIA, UNSPECIFIED SEVERITY, WITHOUT BEHAVIORAL DISTURBANCE, PSYCHOTIC DISTURBANCE, MOOD DISTURBANCE, AND ANXIETY: Chronic | Status: ACTIVE | Noted: 2022-07-08

## 2022-09-21 PROBLEM — I38 ENDOCARDITIS, VALVE UNSPECIFIED: Chronic | Status: ACTIVE | Noted: 2022-07-08

## 2022-09-21 PROBLEM — F03.90 UNSPECIFIED DEMENTIA WITHOUT BEHAVIORAL DISTURBANCE: Chronic | Status: ACTIVE | Noted: 2022-07-08

## 2022-09-21 PROBLEM — K21.9 GASTRO-ESOPHAGEAL REFLUX DISEASE WITHOUT ESOPHAGITIS: Chronic | Status: ACTIVE | Noted: 2022-07-08

## 2022-09-21 PROBLEM — C50.919 MALIGNANT NEOPLASM OF UNSPECIFIED SITE OF UNSPECIFIED FEMALE BREAST: Chronic | Status: ACTIVE | Noted: 2022-07-08

## 2022-09-30 ENCOUNTER — APPOINTMENT (OUTPATIENT)
Dept: ORTHOPEDIC SURGERY | Facility: CLINIC | Age: 82
End: 2022-09-30

## 2022-11-01 ENCOUNTER — APPOINTMENT (OUTPATIENT)
Dept: ORTHOPEDIC SURGERY | Facility: CLINIC | Age: 82
End: 2022-11-01

## 2022-11-01 DIAGNOSIS — S72.141D DISPLACED INTERTROCHANTERIC FRACTURE OF RIGHT FEMUR, SUBSEQUENT ENCOUNTER FOR CLOSED FRACTURE WITH ROUTINE HEALING: ICD-10-CM

## 2022-11-01 PROCEDURE — 73552 X-RAY EXAM OF FEMUR 2/>: CPT | Mod: RT

## 2022-11-01 PROCEDURE — 99213 OFFICE O/P EST LOW 20 MIN: CPT

## 2022-11-01 NOTE — IMAGING
[de-identified] :   82-year-old woman sits reasonably comfortably in my office in no distress.  She is accompanied by her  in the exam room.\par \par Physical examination:\par Right hip:  Some mild tenderness palpation of the trochanteric bursa.  No groin tenderness.  No pain with rotation of her hip joint at 90° of flexion.  No lymph nodes inguinal crease.  Gait nonantalgic.  No Trendelenburg lurch.\par \par Radiographs:\par Right hip (AP, lateral):  Largely healed right intertrochanteric hip fracture.  Hardware remains in place with no evidence of loosening.

## 2022-11-01 NOTE — HISTORY OF PRESENT ILLNESS
[de-identified] :  82-year-old woman returns for interval follow-up status post open reduction internal fixation reverse obliquity right intertrochanteric hip fracture on July 9, 2022.  Patient is now at home having spent about 6 weeks at a rehab unit at [facility].  She is walking with a walker.  Most of her complaints are lateral hip pain.  She denies any fevers or drainage.  She also has some complaints in knee pain in the mornings according to her .  She remains on Tylenol as needed for the pain.  He is not currently enrolled in physical therapy.

## 2022-11-01 NOTE — ASSESSMENT
[FreeTextEntry1] :   82-year-old woman 4 months status post open reduction internal fixation of a right intertrochanteric hip fracture which is healed.  Hardware remains in place.  Patient is now ambulating at least as well as she was prior to her fall.  At this point time there is not much more I can offer this patient regarding rehabilitation.  I would suggest her that she check in with her doctor about osteoporosis.  We talked about osteoporosis and at the very least I think she would benefit from supplemental calcium and vitamin-D.  Because she is a fragility fracture she may also benefit from a brief course of bisphosphonate therapy.  I would like to get a DEXA scan to confirm the diagnosis.  We can see her back to review the findings after the study as well as forward the results to her regular doctor.

## 2023-10-03 NOTE — PROCEDURAL SAFETY CHECKLIST WITH OR WITHOUT SEDATION - NSPRESITESIDESED_GEN_ALL_CORE
[facility] EMERGENCY DEPARTMENT  [address] [address]  Phone: [phone]    Yonathan Davis was seen and treated in our emergency department on 10/2/2023.  He may return to work on 10/04/2023.         Thank you for choosing [facility].    Guille Lemon II, PA      
[facility] EMERGENCY DEPARTMENT  [address] [facility] [address]  Phone: [phone]    Yonathan Davis was seen and treated in our emergency department on 10/2/2023.  He may return to work on 10/04/2023.         Thank you for choosing [facility].    Guille Lemon II, PA      
done...

## 2024-01-13 ENCOUNTER — EMERGENCY (EMERGENCY)
Facility: HOSPITAL | Age: 84
LOS: 0 days | Discharge: HOME | End: 2024-01-13
Attending: EMERGENCY MEDICINE
Payer: MEDICARE

## 2024-01-13 VITALS
OXYGEN SATURATION: 95 % | HEART RATE: 100 BPM | RESPIRATION RATE: 19 BRPM | SYSTOLIC BLOOD PRESSURE: 152 MMHG | DIASTOLIC BLOOD PRESSURE: 69 MMHG | TEMPERATURE: 99 F

## 2024-01-13 DIAGNOSIS — R10.32 LEFT LOWER QUADRANT PAIN: ICD-10-CM

## 2024-01-13 DIAGNOSIS — K57.92 DIVERTICULITIS OF INTESTINE, PART UNSPECIFIED, WITHOUT PERFORATION OR ABSCESS WITHOUT BLEEDING: ICD-10-CM

## 2024-01-13 DIAGNOSIS — Z90.710 ACQUIRED ABSENCE OF BOTH CERVIX AND UTERUS: Chronic | ICD-10-CM

## 2024-01-13 DIAGNOSIS — Z87.19 PERSONAL HISTORY OF OTHER DISEASES OF THE DIGESTIVE SYSTEM: ICD-10-CM

## 2024-01-13 DIAGNOSIS — Z88.0 ALLERGY STATUS TO PENICILLIN: ICD-10-CM

## 2024-01-13 DIAGNOSIS — Z90.12 ACQUIRED ABSENCE OF LEFT BREAST AND NIPPLE: Chronic | ICD-10-CM

## 2024-01-13 LAB
ALBUMIN SERPL ELPH-MCNC: 4.3 G/DL — SIGNIFICANT CHANGE UP (ref 3.5–5.2)
ALBUMIN SERPL ELPH-MCNC: 4.3 G/DL — SIGNIFICANT CHANGE UP (ref 3.5–5.2)
ALP SERPL-CCNC: 95 U/L — SIGNIFICANT CHANGE UP (ref 30–115)
ALP SERPL-CCNC: 95 U/L — SIGNIFICANT CHANGE UP (ref 30–115)
ALT FLD-CCNC: 9 U/L — SIGNIFICANT CHANGE UP (ref 0–41)
ALT FLD-CCNC: 9 U/L — SIGNIFICANT CHANGE UP (ref 0–41)
ANION GAP SERPL CALC-SCNC: 12 MMOL/L — SIGNIFICANT CHANGE UP (ref 7–14)
ANION GAP SERPL CALC-SCNC: 12 MMOL/L — SIGNIFICANT CHANGE UP (ref 7–14)
AST SERPL-CCNC: 22 U/L — SIGNIFICANT CHANGE UP (ref 0–41)
AST SERPL-CCNC: 22 U/L — SIGNIFICANT CHANGE UP (ref 0–41)
BASOPHILS # BLD AUTO: 0.02 K/UL — SIGNIFICANT CHANGE UP (ref 0–0.2)
BASOPHILS # BLD AUTO: 0.02 K/UL — SIGNIFICANT CHANGE UP (ref 0–0.2)
BASOPHILS NFR BLD AUTO: 0.2 % — SIGNIFICANT CHANGE UP (ref 0–1)
BASOPHILS NFR BLD AUTO: 0.2 % — SIGNIFICANT CHANGE UP (ref 0–1)
BILIRUB SERPL-MCNC: 1.5 MG/DL — HIGH (ref 0.2–1.2)
BILIRUB SERPL-MCNC: 1.5 MG/DL — HIGH (ref 0.2–1.2)
BUN SERPL-MCNC: 14 MG/DL — SIGNIFICANT CHANGE UP (ref 10–20)
BUN SERPL-MCNC: 14 MG/DL — SIGNIFICANT CHANGE UP (ref 10–20)
CALCIUM SERPL-MCNC: 9.4 MG/DL — SIGNIFICANT CHANGE UP (ref 8.4–10.5)
CALCIUM SERPL-MCNC: 9.4 MG/DL — SIGNIFICANT CHANGE UP (ref 8.4–10.5)
CHLORIDE SERPL-SCNC: 100 MMOL/L — SIGNIFICANT CHANGE UP (ref 98–110)
CHLORIDE SERPL-SCNC: 100 MMOL/L — SIGNIFICANT CHANGE UP (ref 98–110)
CO2 SERPL-SCNC: 26 MMOL/L — SIGNIFICANT CHANGE UP (ref 17–32)
CO2 SERPL-SCNC: 26 MMOL/L — SIGNIFICANT CHANGE UP (ref 17–32)
CREAT SERPL-MCNC: 1 MG/DL — SIGNIFICANT CHANGE UP (ref 0.7–1.5)
CREAT SERPL-MCNC: 1 MG/DL — SIGNIFICANT CHANGE UP (ref 0.7–1.5)
EGFR: 56 ML/MIN/1.73M2 — LOW
EGFR: 56 ML/MIN/1.73M2 — LOW
EOSINOPHIL # BLD AUTO: 0.04 K/UL — SIGNIFICANT CHANGE UP (ref 0–0.7)
EOSINOPHIL # BLD AUTO: 0.04 K/UL — SIGNIFICANT CHANGE UP (ref 0–0.7)
EOSINOPHIL NFR BLD AUTO: 0.4 % — SIGNIFICANT CHANGE UP (ref 0–8)
EOSINOPHIL NFR BLD AUTO: 0.4 % — SIGNIFICANT CHANGE UP (ref 0–8)
GLUCOSE SERPL-MCNC: 110 MG/DL — HIGH (ref 70–99)
GLUCOSE SERPL-MCNC: 110 MG/DL — HIGH (ref 70–99)
HCT VFR BLD CALC: 39.2 % — SIGNIFICANT CHANGE UP (ref 37–47)
HCT VFR BLD CALC: 39.2 % — SIGNIFICANT CHANGE UP (ref 37–47)
HGB BLD-MCNC: 13 G/DL — SIGNIFICANT CHANGE UP (ref 12–16)
HGB BLD-MCNC: 13 G/DL — SIGNIFICANT CHANGE UP (ref 12–16)
IMM GRANULOCYTES NFR BLD AUTO: 0.7 % — HIGH (ref 0.1–0.3)
IMM GRANULOCYTES NFR BLD AUTO: 0.7 % — HIGH (ref 0.1–0.3)
LIDOCAIN IGE QN: 20 U/L — SIGNIFICANT CHANGE UP (ref 7–60)
LIDOCAIN IGE QN: 20 U/L — SIGNIFICANT CHANGE UP (ref 7–60)
LYMPHOCYTES # BLD AUTO: 1.14 K/UL — LOW (ref 1.2–3.4)
LYMPHOCYTES # BLD AUTO: 1.14 K/UL — LOW (ref 1.2–3.4)
LYMPHOCYTES # BLD AUTO: 10.1 % — LOW (ref 20.5–51.1)
LYMPHOCYTES # BLD AUTO: 10.1 % — LOW (ref 20.5–51.1)
MCHC RBC-ENTMCNC: 29 PG — SIGNIFICANT CHANGE UP (ref 27–31)
MCHC RBC-ENTMCNC: 29 PG — SIGNIFICANT CHANGE UP (ref 27–31)
MCHC RBC-ENTMCNC: 33.2 G/DL — SIGNIFICANT CHANGE UP (ref 32–37)
MCHC RBC-ENTMCNC: 33.2 G/DL — SIGNIFICANT CHANGE UP (ref 32–37)
MCV RBC AUTO: 87.5 FL — SIGNIFICANT CHANGE UP (ref 81–99)
MCV RBC AUTO: 87.5 FL — SIGNIFICANT CHANGE UP (ref 81–99)
MONOCYTES # BLD AUTO: 0.89 K/UL — HIGH (ref 0.1–0.6)
MONOCYTES # BLD AUTO: 0.89 K/UL — HIGH (ref 0.1–0.6)
MONOCYTES NFR BLD AUTO: 7.9 % — SIGNIFICANT CHANGE UP (ref 1.7–9.3)
MONOCYTES NFR BLD AUTO: 7.9 % — SIGNIFICANT CHANGE UP (ref 1.7–9.3)
NEUTROPHILS # BLD AUTO: 9.07 K/UL — HIGH (ref 1.4–6.5)
NEUTROPHILS # BLD AUTO: 9.07 K/UL — HIGH (ref 1.4–6.5)
NEUTROPHILS NFR BLD AUTO: 80.7 % — HIGH (ref 42.2–75.2)
NEUTROPHILS NFR BLD AUTO: 80.7 % — HIGH (ref 42.2–75.2)
NRBC # BLD: 0 /100 WBCS — SIGNIFICANT CHANGE UP (ref 0–0)
NRBC # BLD: 0 /100 WBCS — SIGNIFICANT CHANGE UP (ref 0–0)
PLATELET # BLD AUTO: 141 K/UL — SIGNIFICANT CHANGE UP (ref 130–400)
PLATELET # BLD AUTO: 141 K/UL — SIGNIFICANT CHANGE UP (ref 130–400)
PMV BLD: 11.2 FL — HIGH (ref 7.4–10.4)
PMV BLD: 11.2 FL — HIGH (ref 7.4–10.4)
POTASSIUM SERPL-MCNC: 3.7 MMOL/L — SIGNIFICANT CHANGE UP (ref 3.5–5)
POTASSIUM SERPL-MCNC: 3.7 MMOL/L — SIGNIFICANT CHANGE UP (ref 3.5–5)
POTASSIUM SERPL-SCNC: 3.7 MMOL/L — SIGNIFICANT CHANGE UP (ref 3.5–5)
POTASSIUM SERPL-SCNC: 3.7 MMOL/L — SIGNIFICANT CHANGE UP (ref 3.5–5)
PROT SERPL-MCNC: 6.7 G/DL — SIGNIFICANT CHANGE UP (ref 6–8)
PROT SERPL-MCNC: 6.7 G/DL — SIGNIFICANT CHANGE UP (ref 6–8)
RBC # BLD: 4.48 M/UL — SIGNIFICANT CHANGE UP (ref 4.2–5.4)
RBC # BLD: 4.48 M/UL — SIGNIFICANT CHANGE UP (ref 4.2–5.4)
RBC # FLD: 13.5 % — SIGNIFICANT CHANGE UP (ref 11.5–14.5)
RBC # FLD: 13.5 % — SIGNIFICANT CHANGE UP (ref 11.5–14.5)
SODIUM SERPL-SCNC: 138 MMOL/L — SIGNIFICANT CHANGE UP (ref 135–146)
SODIUM SERPL-SCNC: 138 MMOL/L — SIGNIFICANT CHANGE UP (ref 135–146)
WBC # BLD: 11.24 K/UL — HIGH (ref 4.8–10.8)
WBC # BLD: 11.24 K/UL — HIGH (ref 4.8–10.8)
WBC # FLD AUTO: 11.24 K/UL — HIGH (ref 4.8–10.8)
WBC # FLD AUTO: 11.24 K/UL — HIGH (ref 4.8–10.8)

## 2024-01-13 PROCEDURE — 74177 CT ABD & PELVIS W/CONTRAST: CPT | Mod: 26,MA

## 2024-01-13 PROCEDURE — 85025 COMPLETE CBC W/AUTO DIFF WBC: CPT

## 2024-01-13 PROCEDURE — 99285 EMERGENCY DEPT VISIT HI MDM: CPT | Mod: FS

## 2024-01-13 PROCEDURE — 36415 COLL VENOUS BLD VENIPUNCTURE: CPT

## 2024-01-13 PROCEDURE — 83690 ASSAY OF LIPASE: CPT

## 2024-01-13 PROCEDURE — 74177 CT ABD & PELVIS W/CONTRAST: CPT | Mod: MA

## 2024-01-13 PROCEDURE — 99284 EMERGENCY DEPT VISIT MOD MDM: CPT | Mod: 25

## 2024-01-13 PROCEDURE — 80053 COMPREHEN METABOLIC PANEL: CPT

## 2024-01-13 RX ORDER — METRONIDAZOLE 500 MG
1 TABLET ORAL
Qty: 21 | Refills: 0
Start: 2024-01-13 | End: 2024-01-19

## 2024-01-13 RX ORDER — CIPROFLOXACIN LACTATE 400MG/40ML
1 VIAL (ML) INTRAVENOUS
Qty: 28 | Refills: 0
Start: 2024-01-13 | End: 2024-01-26

## 2024-01-13 NOTE — ED PROVIDER NOTE - CARE PLAN
Assessment and plan of treatment:	ab pain  labs, imaging, supportive care   1 Principal Discharge DX:	Diverticulitis  Assessment and plan of treatment:	ab pain  labs, imaging, supportive care

## 2024-01-13 NOTE — ED PROVIDER NOTE - PROVIDER TOKENS
PROVIDER:[TOKEN:[[id number]]] PROVIDER:[TOKEN:[[id number]]] PROVIDER:[TOKEN:[[id number]]] PROVIDER:[TOKEN:[[id number]]]

## 2024-01-13 NOTE — ED PROVIDER NOTE - PHYSICAL EXAMINATION
CONST: Well appearing in NAD  EYES: PERRL, EOMI, Sclera and conjunctiva clear.   ENT: Oropharynx normal appearing, no erythema or exudates. Uvula midline.  CARD: Normal S1 S2; Normal rate and rhythm  RESP: Equal BS B/L, No wheezes, rhonchi or rales. No distress  GI: Soft, tenderness LLQ, without rebound or guarding.  MS: Normal ROM in all extremities. No midline spinal tenderness.  SKIN: Warm, dry, no acute rashes. Good turgor  NEURO: A&Ox3, No focal deficits. Strength 5/5 with no sensory deficits.

## 2024-01-13 NOTE — ED PROVIDER NOTE - CARE PROVIDER_API CALL
Durga Lorenzana  Gastroenterology  [address] [place] [address]  Phone: [phone]  Fax: [phone]  Follow Up Time:    Durga Lorenzana  Gastroenterology  [address] [place] [address]  Phone: [phone]  Fax: [phone]  Follow Up Time:    Durga Lorenzana  Gastroenterology  [address] [place] [address]  Phone: [phone]  Fax: [phone]  Follow Up Time:    Durga Lorenzana  Gastroenterology  [address] [facility] [address] [address]  Phone: [phone]  Fax: [phone]  Follow Up Time:

## 2024-01-13 NOTE — ED PROVIDER NOTE - OBJECTIVE STATEMENT
82yo female with PMHx BRCA s/p mastectomy in remission, diverticulitis, presents c/o non-radiating LLQ pain intermittently x 1 week, though more persistent today without any specific aggravating or relieving factors. Pt denies diarrhea, fever, chills, vomiting. No urinary symptoms. Pt was seen prior to arrival at an urgent care and directed to ED for further evaluation. 84yo female with PMHx BRCA s/p mastectomy in remission, diverticulitis, presents c/o non-radiating LLQ pain intermittently x 1 week, though more persistent today without any specific aggravating or relieving factors. Pt denies diarrhea, fever, chills, vomiting. No urinary symptoms. Pt was seen prior to arrival at an urgent care and directed to ED for further evaluation.

## 2024-01-13 NOTE — ED PROVIDER NOTE - CARE PROVIDERS DIRECT ADDRESSES
,[email or web address] ,[email or web address]

## 2024-01-13 NOTE — ED PROVIDER NOTE - PATIENT PORTAL LINK FT
You can access the FollowMyHealth Patient Portal offered by  by registering at the following website: [email or web address]. By joining [employer]’s FollowMyHealth portal, you will also be able to view your health information using other applications (apps) compatible with our system. You can access the FollowMyHealth Patient Portal offered by [facility] by registering at the following website: [email or web address]. By joining [employer]’s FollowMyHealth portal, you will also be able to view your health information using other applications (apps) compatible with our system. You can access the FollowMyHealth Patient Portal offered by [facility] by registering at the following website: [email or web address]. By joining [employer]’s FollowMyHealth portal, you will also be able to view your health information using other applications (apps) compatible with our system. You can access the FollowMyHealth Patient Portal offered by [facility] by registering at the following website: [email or web address]. By joining [employer]’s FollowMyHealth portal, you will also be able to view your health information using other applications (apps) compatible with our system.

## 2024-04-30 ENCOUNTER — INPATIENT (INPATIENT)
Facility: HOSPITAL | Age: 84
LOS: 2 days | Discharge: NURSING, REHAB OR LONG-TERM CARE | DRG: 536 | End: 2024-05-03
Attending: STUDENT IN AN ORGANIZED HEALTH CARE EDUCATION/TRAINING PROGRAM | Admitting: INTERNAL MEDICINE
Payer: MEDICARE

## 2024-04-30 VITALS
DIASTOLIC BLOOD PRESSURE: 96 MMHG | RESPIRATION RATE: 18 BRPM | SYSTOLIC BLOOD PRESSURE: 157 MMHG | OXYGEN SATURATION: 98 % | HEART RATE: 66 BPM | TEMPERATURE: 98 F

## 2024-04-30 DIAGNOSIS — S32.9XXA FRACTURE OF UNSPECIFIED PARTS OF LUMBOSACRAL SPINE AND PELVIS, INITIAL ENCOUNTER FOR CLOSED FRACTURE: ICD-10-CM

## 2024-04-30 DIAGNOSIS — Z90.12 ACQUIRED ABSENCE OF LEFT BREAST AND NIPPLE: Chronic | ICD-10-CM

## 2024-04-30 DIAGNOSIS — Z90.710 ACQUIRED ABSENCE OF BOTH CERVIX AND UTERUS: Chronic | ICD-10-CM

## 2024-04-30 LAB
ALBUMIN SERPL ELPH-MCNC: 4.2 G/DL — SIGNIFICANT CHANGE UP (ref 3.5–5.2)
ALP SERPL-CCNC: 78 U/L — SIGNIFICANT CHANGE UP (ref 30–115)
ALT FLD-CCNC: 10 U/L — SIGNIFICANT CHANGE UP (ref 0–41)
ANION GAP SERPL CALC-SCNC: 8 MMOL/L — SIGNIFICANT CHANGE UP (ref 7–14)
AST SERPL-CCNC: 20 U/L — SIGNIFICANT CHANGE UP (ref 0–41)
BASOPHILS # BLD AUTO: 0.01 K/UL — SIGNIFICANT CHANGE UP (ref 0–0.2)
BASOPHILS NFR BLD AUTO: 0.1 % — SIGNIFICANT CHANGE UP (ref 0–1)
BILIRUB SERPL-MCNC: 0.3 MG/DL — SIGNIFICANT CHANGE UP (ref 0.2–1.2)
BUN SERPL-MCNC: 18 MG/DL — SIGNIFICANT CHANGE UP (ref 10–20)
CALCIUM SERPL-MCNC: 9.1 MG/DL — SIGNIFICANT CHANGE UP (ref 8.4–10.5)
CHLORIDE SERPL-SCNC: 103 MMOL/L — SIGNIFICANT CHANGE UP (ref 98–110)
CO2 SERPL-SCNC: 26 MMOL/L — SIGNIFICANT CHANGE UP (ref 17–32)
CREAT SERPL-MCNC: 1 MG/DL — SIGNIFICANT CHANGE UP (ref 0.7–1.5)
EGFR: 56 ML/MIN/1.73M2 — LOW
EOSINOPHIL # BLD AUTO: 0.16 K/UL — SIGNIFICANT CHANGE UP (ref 0–0.7)
EOSINOPHIL NFR BLD AUTO: 2.1 % — SIGNIFICANT CHANGE UP (ref 0–8)
GLUCOSE SERPL-MCNC: 96 MG/DL — SIGNIFICANT CHANGE UP (ref 70–99)
HCT VFR BLD CALC: 31.4 % — LOW (ref 37–47)
HGB BLD-MCNC: 10.7 G/DL — LOW (ref 12–16)
IMM GRANULOCYTES NFR BLD AUTO: 0.6 % — HIGH (ref 0.1–0.3)
LYMPHOCYTES # BLD AUTO: 1.09 K/UL — LOW (ref 1.2–3.4)
LYMPHOCYTES # BLD AUTO: 14.1 % — LOW (ref 20.5–51.1)
MCHC RBC-ENTMCNC: 30.2 PG — SIGNIFICANT CHANGE UP (ref 27–31)
MCHC RBC-ENTMCNC: 34.1 G/DL — SIGNIFICANT CHANGE UP (ref 32–37)
MCV RBC AUTO: 88.7 FL — SIGNIFICANT CHANGE UP (ref 81–99)
MONOCYTES # BLD AUTO: 0.49 K/UL — SIGNIFICANT CHANGE UP (ref 0.1–0.6)
MONOCYTES NFR BLD AUTO: 6.3 % — SIGNIFICANT CHANGE UP (ref 1.7–9.3)
NEUTROPHILS # BLD AUTO: 5.94 K/UL — SIGNIFICANT CHANGE UP (ref 1.4–6.5)
NEUTROPHILS NFR BLD AUTO: 76.8 % — HIGH (ref 42.2–75.2)
NRBC # BLD: 0 /100 WBCS — SIGNIFICANT CHANGE UP (ref 0–0)
PLATELET # BLD AUTO: 127 K/UL — LOW (ref 130–400)
PMV BLD: 10.7 FL — HIGH (ref 7.4–10.4)
POTASSIUM SERPL-MCNC: 3.4 MMOL/L — LOW (ref 3.5–5)
POTASSIUM SERPL-SCNC: 3.4 MMOL/L — LOW (ref 3.5–5)
PROT SERPL-MCNC: 6.1 G/DL — SIGNIFICANT CHANGE UP (ref 6–8)
RBC # BLD: 3.54 M/UL — LOW (ref 4.2–5.4)
RBC # FLD: 13.6 % — SIGNIFICANT CHANGE UP (ref 11.5–14.5)
SODIUM SERPL-SCNC: 137 MMOL/L — SIGNIFICANT CHANGE UP (ref 135–146)
WBC # BLD: 7.74 K/UL — SIGNIFICANT CHANGE UP (ref 4.8–10.8)
WBC # FLD AUTO: 7.74 K/UL — SIGNIFICANT CHANGE UP (ref 4.8–10.8)

## 2024-04-30 PROCEDURE — 85025 COMPLETE CBC W/AUTO DIFF WBC: CPT

## 2024-04-30 PROCEDURE — 72192 CT PELVIS W/O DYE: CPT | Mod: 26,MC

## 2024-04-30 PROCEDURE — 99285 EMERGENCY DEPT VISIT HI MDM: CPT | Mod: FS

## 2024-04-30 PROCEDURE — 99222 1ST HOSP IP/OBS MODERATE 55: CPT

## 2024-04-30 PROCEDURE — 72170 X-RAY EXAM OF PELVIS: CPT | Mod: 26

## 2024-04-30 PROCEDURE — 73502 X-RAY EXAM HIP UNI 2-3 VIEWS: CPT | Mod: 26,RT

## 2024-04-30 PROCEDURE — 83735 ASSAY OF MAGNESIUM: CPT

## 2024-04-30 PROCEDURE — 71045 X-RAY EXAM CHEST 1 VIEW: CPT | Mod: 26

## 2024-04-30 PROCEDURE — 99231 SBSQ HOSP IP/OBS SF/LOW 25: CPT

## 2024-04-30 PROCEDURE — 97162 PT EVAL MOD COMPLEX 30 MIN: CPT | Mod: GP

## 2024-04-30 PROCEDURE — 97116 GAIT TRAINING THERAPY: CPT | Mod: GP

## 2024-04-30 PROCEDURE — 36415 COLL VENOUS BLD VENIPUNCTURE: CPT

## 2024-04-30 PROCEDURE — 80053 COMPREHEN METABOLIC PANEL: CPT

## 2024-04-30 PROCEDURE — 97110 THERAPEUTIC EXERCISES: CPT | Mod: GP

## 2024-04-30 PROCEDURE — 73552 X-RAY EXAM OF FEMUR 2/>: CPT | Mod: 26,RT

## 2024-04-30 RX ORDER — ACETAMINOPHEN 500 MG
1000 TABLET ORAL EVERY 8 HOURS
Refills: 0 | Status: DISCONTINUED | OUTPATIENT
Start: 2024-04-30 | End: 2024-05-03

## 2024-04-30 RX ORDER — TRAMADOL HYDROCHLORIDE 50 MG/1
25 TABLET ORAL EVERY 8 HOURS
Refills: 0 | Status: DISCONTINUED | OUTPATIENT
Start: 2024-04-30 | End: 2024-05-03

## 2024-04-30 RX ORDER — RAMIPRIL 5 MG
1 CAPSULE ORAL
Qty: 0 | Refills: 0 | DISCHARGE

## 2024-04-30 RX ORDER — ESCITALOPRAM OXALATE 10 MG/1
20 TABLET, FILM COATED ORAL DAILY
Refills: 0 | Status: DISCONTINUED | OUTPATIENT
Start: 2024-04-30 | End: 2024-05-03

## 2024-04-30 RX ORDER — SIMVASTATIN 20 MG/1
1 TABLET, FILM COATED ORAL
Qty: 0 | Refills: 0 | DISCHARGE

## 2024-04-30 RX ORDER — PANTOPRAZOLE SODIUM 20 MG/1
40 TABLET, DELAYED RELEASE ORAL
Refills: 0 | Status: DISCONTINUED | OUTPATIENT
Start: 2024-04-30 | End: 2024-05-03

## 2024-04-30 RX ORDER — ACETAMINOPHEN 500 MG
650 TABLET ORAL EVERY 6 HOURS
Refills: 0 | Status: DISCONTINUED | OUTPATIENT
Start: 2024-04-30 | End: 2024-04-30

## 2024-04-30 RX ORDER — MIRTAZAPINE 45 MG/1
15 TABLET, ORALLY DISINTEGRATING ORAL AT BEDTIME
Refills: 0 | Status: DISCONTINUED | OUTPATIENT
Start: 2024-04-30 | End: 2024-05-03

## 2024-04-30 RX ORDER — MIRTAZAPINE 45 MG/1
7.5 TABLET, ORALLY DISINTEGRATING ORAL
Qty: 0 | Refills: 0 | DISCHARGE

## 2024-04-30 RX ORDER — DONEPEZIL HYDROCHLORIDE 10 MG/1
1 TABLET, FILM COATED ORAL
Qty: 0 | Refills: 0 | DISCHARGE

## 2024-04-30 RX ORDER — LIDOCAINE 4 G/100G
1 CREAM TOPICAL EVERY 24 HOURS
Refills: 0 | Status: DISCONTINUED | OUTPATIENT
Start: 2024-04-30 | End: 2024-05-03

## 2024-04-30 RX ORDER — HEPARIN SODIUM 5000 [USP'U]/ML
5000 INJECTION INTRAVENOUS; SUBCUTANEOUS EVERY 8 HOURS
Refills: 0 | Status: DISCONTINUED | OUTPATIENT
Start: 2024-04-30 | End: 2024-05-03

## 2024-04-30 RX ORDER — ICOSAPENT ETHYL 500 MG/1
2 CAPSULE, LIQUID FILLED ORAL
Qty: 0 | Refills: 0 | DISCHARGE

## 2024-04-30 RX ORDER — POTASSIUM CHLORIDE 20 MEQ
20 PACKET (EA) ORAL ONCE
Refills: 0 | Status: COMPLETED | OUTPATIENT
Start: 2024-04-30 | End: 2024-04-30

## 2024-04-30 RX ORDER — HYDROXYZINE HCL 10 MG
10 TABLET ORAL
Qty: 0 | Refills: 0 | DISCHARGE

## 2024-04-30 RX ORDER — MIRTAZAPINE 45 MG/1
1 TABLET, ORALLY DISINTEGRATING ORAL
Refills: 0 | DISCHARGE

## 2024-04-30 RX ORDER — SODIUM CHLORIDE 9 MG/ML
1000 INJECTION INTRAMUSCULAR; INTRAVENOUS; SUBCUTANEOUS
Refills: 0 | Status: DISCONTINUED | OUTPATIENT
Start: 2024-04-30 | End: 2024-04-30

## 2024-04-30 RX ORDER — ONDANSETRON 8 MG/1
4 TABLET, FILM COATED ORAL EVERY 8 HOURS
Refills: 0 | Status: DISCONTINUED | OUTPATIENT
Start: 2024-04-30 | End: 2024-05-03

## 2024-04-30 RX ADMIN — Medication 1000 MILLIGRAM(S): at 22:17

## 2024-04-30 RX ADMIN — Medication 20 MILLIEQUIVALENT(S): at 17:39

## 2024-04-30 RX ADMIN — SODIUM CHLORIDE 75 MILLILITER(S): 9 INJECTION INTRAMUSCULAR; INTRAVENOUS; SUBCUTANEOUS at 07:46

## 2024-04-30 RX ADMIN — HEPARIN SODIUM 5000 UNIT(S): 5000 INJECTION INTRAVENOUS; SUBCUTANEOUS at 14:23

## 2024-04-30 RX ADMIN — Medication 650 MILLIGRAM(S): at 15:31

## 2024-04-30 RX ADMIN — LIDOCAINE 1 PATCH: 4 CREAM TOPICAL at 17:40

## 2024-04-30 RX ADMIN — Medication 650 MILLIGRAM(S): at 08:20

## 2024-04-30 RX ADMIN — LIDOCAINE 1 PATCH: 4 CREAM TOPICAL at 19:53

## 2024-04-30 RX ADMIN — Medication 650 MILLIGRAM(S): at 07:50

## 2024-04-30 RX ADMIN — HEPARIN SODIUM 5000 UNIT(S): 5000 INJECTION INTRAVENOUS; SUBCUTANEOUS at 22:17

## 2024-04-30 RX ADMIN — MIRTAZAPINE 15 MILLIGRAM(S): 45 TABLET, ORALLY DISINTEGRATING ORAL at 22:19

## 2024-04-30 RX ADMIN — PANTOPRAZOLE SODIUM 40 MILLIGRAM(S): 20 TABLET, DELAYED RELEASE ORAL at 07:42

## 2024-04-30 RX ADMIN — ESCITALOPRAM OXALATE 20 MILLIGRAM(S): 10 TABLET, FILM COATED ORAL at 12:03

## 2024-04-30 NOTE — ED PROVIDER NOTE - CLINICAL SUMMARY MEDICAL DECISION MAKING FREE TEXT BOX
83-yo female with a pmh of dementia (AAO x1 baseline), GERD, breast cancer s/p mastectomy in remission  Patient status post mechanical fall slept slight landing on right side no head injury no LOC no antiplatelet or anticoagulation complains of right hip pain with history of right hip repair in the past family member palpation up painful ambulation reported by EMS neuro vascularly intact nonfocal neuro cb2-12 intact 5/5 strength all extremities  no s ecchymosis abrasion on exam from all joints.   xray pelvis hip no frx, CT pelvis noncontrast: Acute minimally displaced fracture of the right superior pubic ramus and   	comminuted fracture of the right inferior pubic ramus.  	  	Acute minimally displaced fracture of the right pubic tubercle   Patient admitted for ortho  PT  Labs resulted at the time of my review were noted to be within acceptable parameters

## 2024-04-30 NOTE — ED PROVIDER NOTE - WR ORDER NAME 1
PATIENT:MARLENA JUAN                      DATE OF SERVICE: 12/11/19
SEX: F                                  MEDICAL RECORD: [id number]
DATE OF BIRTH: [birth year]                        LOCATION:D[facility]          
AGE OF PATIENT: 73                             ADMISSION DATE: 12/11/19
 
REFERRING PHYSICIAN:                               
 
INTERPRETING PHYSICIAN: RADHA NIX MD             
 
 
 
                             ECHOCARDIOGRAM REPORT
  ECHO CHARGES 4               ECHO COMPLETE                 Date: 12/11/19
 
 
 
CLINICAL DIAGNOSIS: AS                            
                    H/O HTN                       
                         ECHOCARDIOGRAPHIC MEASUREMENTS
      (adult normal given)
   AC root (d.<3.7cm) 2.2  cm   LV Septum d (<1.2 cm> 1.1  cm
      Valve Excursion 1.1  cm     LV Septum (systole) 1.9  cm
Left Atria (s.<4.0cm> 3.1  cm          LVPW d(<1.2cm) 1.1  cm
        RV (d.<2.3cm) 2.2  cm           LVPW (sytole) 2.0  cm
  LV diastole(<5.6CM) 4.5  cm       MV E-F(>70mm/sec)      cm
           LV systole 1.8  cm           LVOT Diameter 1.5  cm
       MV exc.(>10mm)      cm
Est.ejection fraction (50-75%)     %
 
   DOPPLER:
     LVIT      cm/sec A 93.0 cm/sec E 127   cm/sec
       LA      cm/sec      RVSP 31.0 mmHg
     LVOT 130  cm/sec   AOP1/2T      m/s
  Asc. Ao 494  cm/sec
     RVOT 84.0 cm/sec
       RA      cm/sec
         cm/sec
 AV Gradient Peak 98.0 mmHg  AV Mean 61.2 mmHg  AV Area 0.5  cm
 MV Gradient Peak 8.7  mmHg  MV Mean 2.9  mmHg  MV Area      cm
   COMMENTS: OP - HC                                      
 
 
 Cardiac Sonographer: Paradise MENESESOE            
      Cardiologist: 1          Dr. Nix                
             TAPE# PACS           
                                       Pericardial Effusion N                        
 
 
DATE OF SERVICE:  
 
FINDINGS:
1. Left ventricular chamber size is within normal limits.  Left ventricular
systolic function is normal.  Overall ejection fraction estimated at 65%.
2. Left atrium, right atrium, or right ventricle chamber sizes are within normal
limits.
3. Valvular structures have normal structure and motion.
4. Doppler interrogation reveals severe aortic stenosis, valve area calculates
to 0.5 cm-squared.  There is a gradient of 98 mm across the valve.  The
 
 
 
ECHOCARDIOGRAM REPORT                          [id number]    MARLENA JUAN              
 
 
remaining valvular structures have normal structure and motion.
5. Doppler interrogation elsewise reveals mild tricuspid regurgitation, no other
valvular insufficiency or stenosis.  Pulmonary systolic pressure estimated at 31
mmHg.
6. No evidence of pericardial effusion or left ventricular thrombus.
 
TRANSINT:[id number] Voice Confirmation ID: [id number] DOCUMENT ID: [id number]
                                           
                                           RADHA NIX MD             
 
 
 
Electronically Signed by RADHA NIX on 12/12/19 at 1040
 
 
 
 
 
 
 
 
 
 
 
 
 
 
 
 
 
 
 
 
 
 
 
 
 
 
 
 
 
 
 
 
 
CC:                                                             [id number]
DICTATION DATE: 12/11/19 1708     :     12/12/19 0150      DEP CLI 
                                                                      12/11/19
[facility]                                          
[address] [place] [address]
Detail Level: Zone
Render In Strict Bullet Format?: No
Modify Regimen: Lather to scalp the ketoconazole shampoo and leave on 5 minutes in the morning 3 times a week.
breastfeeding exclusively
Xray Chest 1 View-PORTABLE IMMEDIATE

## 2024-04-30 NOTE — ED ADULT NURSE NOTE - NSFALLRISKINTERV_ED_ALL_ED

## 2024-04-30 NOTE — ED PROVIDER NOTE - OBJECTIVE STATEMENT
83 year old female past medical history of breast CA, dementia, GERD, right hip fracture with repair comes to emergency room for right hip pain status post fall. patient states was walking and tripped and landed on right side. no head injury no loss of consciousness. patient states after fall was unable to stand and bare weight and called 911. denies fever/chills.

## 2024-04-30 NOTE — H&P ADULT - NSHPPHYSICALEXAM_GEN_ALL_CORE
VITALS:   T(C): 35.4 (04-30-24 @ 05:09), Max: 36.7 (04-30-24 @ 00:50)  HR: 88 (04-30-24 @ 05:09) (66 - 88)  BP: 129/61 (04-30-24 @ 05:09) (129/61 - 157/96)  RR: 16 (04-30-24 @ 05:09) (16 - 18)  SpO2: 95% (04-30-24 @ 05:09) (95% - 98%)    GENERAL: NAD, lying in bed comfortably  HEAD:  Atraumatic, Normocephalic  EYES: EOMI,  conjunctiva and sclera clear  ENT: Moist mucous membranes  NECK: Supple, No JVD  CHEST/LUNG: CTA b/l, Unlabored respirations  HEART: Regular rate and rhythm; No murmurs, rubs, or gallops  ABDOMEN: Bowel sounds present; Soft, Nontender, Nondistended.   EXTREMITIES: decreased ROM RLE, +ttp right hip region, no RLE external rotation or shortening,  No clubbing, cyanosis, or edema  NERVOUS SYSTEM:  Alert & Oriented X1, speech clear. No deficits   MSK: FROM all 4 extremities, full and equal strength  SKIN: No rashes or lesions

## 2024-04-30 NOTE — H&P ADULT - ASSESSMENT
# Pelvic fracture.   # Fall at home. 83 year old female past medical history of dementia, HTN, HDL, breast CA, dementia, GERD, falls, right hip fracture s/p ORIF in 2022 presented to emergency room for right hip pain status post fall.     # Pelvic fracture  # Fall at home  -  83 year old female past medical history of dementia, HTN, HDL, breast CA, dementia, GERD, falls, right hip fracture s/p ORIF in 2022 presented to emergency room for right hip pain status post fall.     # Pelvic fracture  # Fall at home  - bed rest  - pain control  - ortho consult  - VTE    # GERD  - c/w protonix    # HTN  # HDL  - pt no longer taking meds 83 year old female past medical history of dementia, HTN, HDL, breast CA, dementia, GERD, falls, right hip fracture s/p ORIF in 2022 presented to emergency room for right hip pain status post fall.     # Pelvic fracture  # Fall at home  - bed rest  - pain control  - ortho consult  - VTE    # Hypokalemia  - K+3.4  - reassess with morning labs    # GERD  - c/w protonix    # HTN  # HDL  - pt no longer taking meds 83 year old female past medical history of dementia, HTN, HDL, breast CA, dementia, GERD, falls, right hip fracture s/p ORIF in 2022 presented to emergency room for right hip pain status post fall.     # Pelvic fracture  # Fall at home  - CT showing + Fx  - bed rest  - pain control  - ortho consult  - VTE  - if no ortho surgical intervention, please put pt on a DASH diet    # Hypokalemia  - K+3.4  - reassess with morning labs    # GERD  - c/w protonix    # HTN  # HDL  - pt no longer taking meds 83 year old female past medical history of dementia, HTN, HDL, breast CA, dementia, GERD, falls, right hip fracture s/p ORIF in 2022 presented to emergency room for right hip pain status post fall.     # R Pelvic fractures  # Fall at home  - CT showing + Fx  - bed rest  - pain control  - ortho consult  - VTE  - if no ortho surgical intervention, please put pt on a DASH diet    # Hypokalemia  - K+3.4  - reassess with morning labs    # GERD  - c/w protonix    # HTN  # HDL  - pt no longer taking meds

## 2024-04-30 NOTE — CONSULT NOTE ADULT - SUBJECTIVE AND OBJECTIVE BOX
HPI:  83 year old female past medical history of dementia, HTN, HDL, breast CA, dementia, GERD, falls, right hip fracture s/p ORIF in 2022 presented to emergency room for right hip pain status post fall. patient states was walking  in her house at around 11:30pm last night, slipped and fell landed on right side. States it hurts when she moves her right leg, but pain improves with laying still. no head injury no loss of consciousness. patient states after fall was unable to stand and bare weight and called 911. denies fever/chills, CP, abd pain, SOB.  (30 Apr 2024 05:16)      PAST MEDICAL & SURGICAL HISTORY:  Dementia      GERD (gastroesophageal reflux disease)      Breast cancer      Leaky heart valve      S/P hysterectomy      S/P left mastectomy      MEDICATIONS  (STANDING):  escitalopram 20 milliGRAM(s) Oral daily  heparin   Injectable 5000 Unit(s) SubCutaneous every 8 hours  mirtazapine 15 milliGRAM(s) Oral at bedtime  pantoprazole    Tablet 40 milliGRAM(s) Oral before breakfast  sodium chloride 0.9%. 1000 milliLiter(s) (75 mL/Hr) IV Continuous <Continuous>    MEDICATIONS  (PRN):  acetaminophen     Tablet .. 650 milliGRAM(s) Oral every 6 hours PRN Temp greater or equal to 38C (100.4F), Mild Pain (1 - 3)  aluminum hydroxide/magnesium hydroxide/simethicone Suspension 30 milliLiter(s) Oral every 4 hours PRN Dyspepsia  ondansetron Injectable 4 milliGRAM(s) IV Push every 8 hours PRN Nausea and/or Vomiting      < from: CT Pelvis Bony Only No Cont (04.30.24 @ 02:51) >    ACC: [id number] EXAM:  CT PELVIS BONY ONLY   ORDERED BY: HEDY MARC     PROCEDURE DATE:  04/30/2024          INTERPRETATION:  CLINICAL STATEMENT: Fall, right-sided pain    TECHNIQUE: Multislice helical sections were obtained from the iliac   crests to the pubic symphysis without intravenous contrast. Oral contrast   was not administered. Reformatted images in the coronal and sagittal   planes were acquired.    COMPARISON CT: CT abdomen pelvis 1/13/2024    FINDINGS:    PELVIC NODES: Unremarkable.    PELVIC ORGANS: Post hysterectomy.    PERITONEUM/MESENTERY/BOWEL: Colonic diverticulosis.    BONES/SOFT TISSUES:  Chronic right femoral intertrochanteric fracture status post gamma nail   fixation.    Acute comminuted minimally displaced right inferior pubic rami fractures.  Acute comminuted right superior pubic ramus fracture extending to the   pubic body.    Mild right posterior lateral hip subcutaneous contusive changes.    OTHER: Vascular calcifications.      IMPRESSION:  Acute right superior and inferior pubic rami fractures as above.    --- End of Report ---          ROCAEL GOMEZ MD; Resident Radiologist  This document has been electronically signed.  PENNY RODRIGUEZ MD; Attending Radiologist  This document has been electronically signed. Apr 30 2024  3:56AM    < end of copied text >  < from: Xray Femur 2 Views, Right (04.30.24 @ 01:35) >    ACC: [id number] EXAM:  XR FEMUR 2 VIEWS RT   ORDERED BY: HEDY MORRISONESE     PROCEDURE DATE:  04/30/2024          INTERPRETATION:  Clinical History / Reason for exam: Trauma.    TECHNIQUE: 5 views of the right femur.    COMPARISON: CT pelvis 4/30/2024.    Findings/  impression:    Minimally displaced right pubic rami fractures better seen on same-day CT.    No acute fracture of the right femur. Postsurgical changes of the right   femur.    Degenerative osseous changes.    --- End of Report ---            RICCARDO POLO MD; Attending Radiologist  This document has been electronically signed. Apr 30 2024 10:30AM    < end of copied text >  < from: Xray Hip 2-3 Views, Right (04.30.24 @ 01:35) >    ACC: [id number] EXAM:  XR HIP 2-3V RT   ORDERED BY: HEDY MORRISONESE     PROCEDURE DATE:  04/30/2024          INTERPRETATION:  CLINICAL HISTORY/REASON FOR EXAM: Trauma    TECHNIQUE: 2 views of the right hip    COMPARISON: Hip radiograph 7/8/2022, Correlated with pelvic radiograph   4/30/2024    FINDINGS/  IMPRESSION:    Right femoral intramedullary gamma nail.    No acute fracture or dislocation.    --- End of Report ---          ROCAEL GOMEZ MD; Resident Radiologist  This document has been electronically signed.  PENNY RODRIGUEZ MD; Attending Radiologist  This document has been electronically signed. Apr 30 2024  2:15AM    < end of copied text >                            10.7   7.74  )-----------( 127      ( 30 Apr 2024 01:47 )             31.4       04-30    137  |  103  |  18  ----------------------------<  96  3.4<L>   |  26  |  1.0    Ca    9.1      30 Apr 2024 01:47    TPro  6.1  /  Alb  4.2  /  TBili  0.3  /  DBili  x   /  AST  20  /  ALT  10  /  AlkPhos  78  04-30       Review of Systems:  Review of Systems: Constitutional: (-) fever (-) chills   Eyes: (-) visual changes  ENMT: (-) nasal or chest congestion (-) runny nose (-) sore throat (-) neck pain (-) neck stiffness  Cardiac: (-) chest pain (-) syncope  Respiratory: (-) cough (-) SOB  GI: (-) nausea (-) vomiting (-) diarrhea  : (-) incontinence  MS:(-) back pain   Neuro: (-) head injury (-) headache (-) dizziness (-) numbness/tingling to extremities B/L (-) LOC   Skin: (-) abrasion (-) rash (-) laceration  Except as documented in the HPI, all other systems are negative.     Physical Exam: GENERAL:   NAD, resting comfortably.  HEAD:  Atraumatic, Normocephalic  EYES: EOMI, conjunctiva and sclera clear  NECK: Supple, non-tender  CHEST/LUNG: unlabored breathing on room air  HEART: Regular rate and rhythm;  ABDOMEN: Soft, Nontender, Nondistended  EXTREMITIES:  BUE no pain on ROM throughout,  Peripheral Pulses Present, No clubbing, no cyanosis, or no edema, no calf tenderness,   Pelvis stable. . Limited ROM R hip  2/2 pain. B/L knee with painless ROM, DP 2+. Sensation intact.  PSYCH: AAOx3, cooperative, appropriate  SKIN: WNL

## 2024-04-30 NOTE — ED PROVIDER NOTE - PHYSICAL EXAMINATION
Physical Exam    Vital Signs: I have reviewed the initial vital signs.  Constitutional:  no acute distress  Eyes: Conjunctiva pink, Sclera clear, PERRLA, EOMI.  Cardiovascular: S1 and S2, regular rate, regular rhythm, well-perfused extremities, radial pulses equal and 2+  Respiratory: unlabored respiratory effort, clear to auscultation bilaterally no wheezing, rales and rhonchi  Gastrointestinal: soft, non-tender abdomen, no pulsatile mass, normal bowl sounds  Musculoskeletal: supple neck, no lower extremity edema, no midline tenderness + right hip tenderness with FROM of internal/external rotation with pain on flexion and extension. unable to bare weight   Integumentary: warm, dry, no rash  Neurologic: awake, alert, cranial nerves II-XII grossly intact, extremities’ motor and sensory functions grossly intact  Psychiatric: appropriate mood, appropriate affect

## 2024-04-30 NOTE — CONSULT NOTE ADULT - ASSESSMENT
Right sup/inf rami fx  - no acute ortho intervention, nonoperative management   - protected weight bearing (wbat with walker)  -pain control  -PT eval   -outpatient follow up in 2 weeks   discussed with daughter at bedside

## 2024-04-30 NOTE — H&P ADULT - NSHPLABSRESULTS_GEN_ALL_CORE
LABS:  cret                        10.7   7.74  )-----------( 127      ( 30 Apr 2024 01:47 )             31.4     04-30    137  |  103  |  18  ----------------------------<  96  3.4<L>   |  26  |  1.0    Ca    9.1      30 Apr 2024 01:47    TPro  6.1  /  Alb  4.2  /  TBili  0.3  /  DBili  x   /  AST  20  /  ALT  10  /  AlkPhos  78  04-30 LABS:  cret                        10.7   7.74  )-----------( 127      ( 30 Apr 2024 01:47 )             31.4     04-30    137  |  103  |  18  ----------------------------<  96  3.4<L>   |  26  |  1.0    Ca    9.1      30 Apr 2024 01:47    TPro  6.1  /  Alb  4.2  /  TBili  0.3  /  DBili  x   /  AST  20  /  ALT  10  /  AlkPhos  78  04-30      RADIOLOGY:     CT Pelvis Bony Only No Cont (04.30.24 @ 02:51)     IMPRESSION:  Acute right superior and inferior pubic rami fractures

## 2024-04-30 NOTE — ED PROVIDER NOTE - WR ORDER DATE AND TIME 2
30-Apr-2024 01:12 Sarecycline Counseling: Patient advised regarding possible photosensitivity and discoloration of the teeth, skin, lips, tongue and gums.  Patient instructed to avoid sunlight, if possible.  When exposed to sunlight, patients should wear protective clothing, sunglasses, and sunscreen.  The patient was instructed to call the office immediately if the following severe adverse effects occur:  hearing changes, easy bruising/bleeding, severe headache, or vision changes.  The patient verbalized understanding of the proper use and possible adverse effects of sarecycline.  All of the patient's questions and concerns were addressed.

## 2024-04-30 NOTE — PATIENT PROFILE ADULT - FALL HARM RISK - HARM RISK INTERVENTIONS
Assistance with ambulation/Assistance OOB with selected safe patient handling equipment/Communicate Risk of Fall with Harm to all staff/Discuss with provider need for PT consult/Monitor gait and stability/Reinforce activity limits and safety measures with patient and family/Tailored Fall Risk Interventions/Visual Cue: Yellow wristband and red socks/Bed in lowest position, wheels locked, appropriate side rails in place/Call bell, personal items and telephone in reach/Instruct patient to call for assistance before getting out of bed or chair/Non-slip footwear when patient is out of bed/[place] to call system/Physically safe environment - no spills, clutter or unnecessary equipment/Purposeful Proactive Rounding/Room/bathroom lighting operational, light cord in reach

## 2024-04-30 NOTE — H&P ADULT - HISTORY OF PRESENT ILLNESS
83 year old female past medical history of breast CA, dementia, GERD, right hip fracture with repair comes to emergency room for right hip pain status post fall. patient states was walking and tripped and landed on right side. no head injury no loss of consciousness. patient states after fall was unable to stand and bare weight and called 911. denies fever/chills. 83 year old female past medical history of dementia, HTN, HDL, breast CA, dementia, GERD, falls, right hip fracture s/p ORIF in 2022 presented to emergency room for right hip pain status post fall. [facility], slipped and fell landed on right side. States it hurts when she moves her right leg. no head injury no loss of consciousness. patient states after fall was unable to stand and bare weight and called 911. denies fever/chills, CP, abd pain, SOB.  83 year old female past medical history of dementia, HTN, HDL, breast CA, dementia, GERD, falls, right hip fracture s/p ORIF in 2022 presented to emergency room for right hip pain status post fall. patient states was walking  in her house at around 11:30pm last night, slipped and fell landed on right side. States it hurts when she moves her right leg, but pain improves with laying still. no head injury no loss of consciousness. patient states after fall was unable to stand and bare weight and called 911. denies fever/chills, CP, abd pain, SOB.

## 2024-05-01 LAB
ALBUMIN SERPL ELPH-MCNC: 4 G/DL — SIGNIFICANT CHANGE UP (ref 3.5–5.2)
ALP SERPL-CCNC: 73 U/L — SIGNIFICANT CHANGE UP (ref 30–115)
ALT FLD-CCNC: 7 U/L — SIGNIFICANT CHANGE UP (ref 0–41)
ANION GAP SERPL CALC-SCNC: 15 MMOL/L — HIGH (ref 7–14)
AST SERPL-CCNC: 16 U/L — SIGNIFICANT CHANGE UP (ref 0–41)
BASOPHILS # BLD AUTO: 0.01 K/UL — SIGNIFICANT CHANGE UP (ref 0–0.2)
BASOPHILS NFR BLD AUTO: 0.2 % — SIGNIFICANT CHANGE UP (ref 0–1)
BILIRUB SERPL-MCNC: 0.9 MG/DL — SIGNIFICANT CHANGE UP (ref 0.2–1.2)
BUN SERPL-MCNC: 10 MG/DL — SIGNIFICANT CHANGE UP (ref 10–20)
CALCIUM SERPL-MCNC: 8.6 MG/DL — SIGNIFICANT CHANGE UP (ref 8.4–10.5)
CHLORIDE SERPL-SCNC: 106 MMOL/L — SIGNIFICANT CHANGE UP (ref 98–110)
CO2 SERPL-SCNC: 23 MMOL/L — SIGNIFICANT CHANGE UP (ref 17–32)
CREAT SERPL-MCNC: 0.8 MG/DL — SIGNIFICANT CHANGE UP (ref 0.7–1.5)
EGFR: 73 ML/MIN/1.73M2 — SIGNIFICANT CHANGE UP
EOSINOPHIL # BLD AUTO: 0.22 K/UL — SIGNIFICANT CHANGE UP (ref 0–0.7)
EOSINOPHIL NFR BLD AUTO: 4.1 % — SIGNIFICANT CHANGE UP (ref 0–8)
GLUCOSE SERPL-MCNC: 116 MG/DL — HIGH (ref 70–99)
HCT VFR BLD CALC: 31.1 % — LOW (ref 37–47)
HGB BLD-MCNC: 10.7 G/DL — LOW (ref 12–16)
IMM GRANULOCYTES NFR BLD AUTO: 0.4 % — HIGH (ref 0.1–0.3)
LYMPHOCYTES # BLD AUTO: 0.7 K/UL — LOW (ref 1.2–3.4)
LYMPHOCYTES # BLD AUTO: 13.1 % — LOW (ref 20.5–51.1)
MAGNESIUM SERPL-MCNC: 1.7 MG/DL — LOW (ref 1.8–2.4)
MANUAL SMEAR VERIFICATION: SIGNIFICANT CHANGE UP
MCHC RBC-ENTMCNC: 30.4 PG — SIGNIFICANT CHANGE UP (ref 27–31)
MCHC RBC-ENTMCNC: 34.4 G/DL — SIGNIFICANT CHANGE UP (ref 32–37)
MCV RBC AUTO: 88.4 FL — SIGNIFICANT CHANGE UP (ref 81–99)
MONOCYTES # BLD AUTO: 0.42 K/UL — SIGNIFICANT CHANGE UP (ref 0.1–0.6)
MONOCYTES NFR BLD AUTO: 7.9 % — SIGNIFICANT CHANGE UP (ref 1.7–9.3)
NEUTROPHILS # BLD AUTO: 3.98 K/UL — SIGNIFICANT CHANGE UP (ref 1.4–6.5)
NEUTROPHILS NFR BLD AUTO: 74.3 % — SIGNIFICANT CHANGE UP (ref 42.2–75.2)
NRBC # BLD: 0 /100 WBCS — SIGNIFICANT CHANGE UP (ref 0–0)
PLAT MORPH BLD: NORMAL — SIGNIFICANT CHANGE UP
PLATELET # BLD AUTO: 95 K/UL — LOW (ref 130–400)
PLATELET CLUMP BLD QL SMEAR: SIGNIFICANT CHANGE UP
PLATELET COUNT - ESTIMATE: ABNORMAL
PMV BLD: 11.6 FL — HIGH (ref 7.4–10.4)
POTASSIUM SERPL-MCNC: 3.1 MMOL/L — LOW (ref 3.5–5)
POTASSIUM SERPL-SCNC: 3.1 MMOL/L — LOW (ref 3.5–5)
PROT SERPL-MCNC: 6 G/DL — SIGNIFICANT CHANGE UP (ref 6–8)
RBC # BLD: 3.52 M/UL — LOW (ref 4.2–5.4)
RBC # FLD: 13.7 % — SIGNIFICANT CHANGE UP (ref 11.5–14.5)
RBC BLD AUTO: NORMAL — SIGNIFICANT CHANGE UP
SODIUM SERPL-SCNC: 144 MMOL/L — SIGNIFICANT CHANGE UP (ref 135–146)
WBC # BLD: 5.35 K/UL — SIGNIFICANT CHANGE UP (ref 4.8–10.8)
WBC # FLD AUTO: 5.35 K/UL — SIGNIFICANT CHANGE UP (ref 4.8–10.8)

## 2024-05-01 PROCEDURE — 99232 SBSQ HOSP IP/OBS MODERATE 35: CPT

## 2024-05-01 RX ORDER — CHLORHEXIDINE GLUCONATE 213 G/1000ML
1 SOLUTION TOPICAL
Refills: 0 | Status: DISCONTINUED | OUTPATIENT
Start: 2024-05-01 | End: 2024-05-03

## 2024-05-01 RX ORDER — POTASSIUM CHLORIDE 20 MEQ
40 PACKET (EA) ORAL EVERY 4 HOURS
Refills: 0 | Status: COMPLETED | OUTPATIENT
Start: 2024-05-01 | End: 2024-05-01

## 2024-05-01 RX ORDER — MAGNESIUM SULFATE 500 MG/ML
2 VIAL (ML) INJECTION ONCE
Refills: 0 | Status: COMPLETED | OUTPATIENT
Start: 2024-05-01 | End: 2024-05-01

## 2024-05-01 RX ADMIN — Medication 1000 MILLIGRAM(S): at 22:57

## 2024-05-01 RX ADMIN — PANTOPRAZOLE SODIUM 40 MILLIGRAM(S): 20 TABLET, DELAYED RELEASE ORAL at 05:40

## 2024-05-01 RX ADMIN — HEPARIN SODIUM 5000 UNIT(S): 5000 INJECTION INTRAVENOUS; SUBCUTANEOUS at 11:32

## 2024-05-01 RX ADMIN — LIDOCAINE 1 PATCH: 4 CREAM TOPICAL at 22:07

## 2024-05-01 RX ADMIN — TRAMADOL HYDROCHLORIDE 25 MILLIGRAM(S): 50 TABLET ORAL at 11:31

## 2024-05-01 RX ADMIN — Medication 25 GRAM(S): at 16:59

## 2024-05-01 RX ADMIN — HEPARIN SODIUM 5000 UNIT(S): 5000 INJECTION INTRAVENOUS; SUBCUTANEOUS at 21:55

## 2024-05-01 RX ADMIN — LIDOCAINE 1 PATCH: 4 CREAM TOPICAL at 17:01

## 2024-05-01 RX ADMIN — Medication 1000 MILLIGRAM(S): at 00:02

## 2024-05-01 RX ADMIN — HEPARIN SODIUM 5000 UNIT(S): 5000 INJECTION INTRAVENOUS; SUBCUTANEOUS at 05:38

## 2024-05-01 RX ADMIN — Medication 1000 MILLIGRAM(S): at 05:40

## 2024-05-01 RX ADMIN — Medication 40 MILLIEQUIVALENT(S): at 17:11

## 2024-05-01 RX ADMIN — LIDOCAINE 1 PATCH: 4 CREAM TOPICAL at 05:40

## 2024-05-01 RX ADMIN — Medication 40 MILLIEQUIVALENT(S): at 21:50

## 2024-05-01 RX ADMIN — Medication 1000 MILLIGRAM(S): at 17:00

## 2024-05-01 RX ADMIN — CHLORHEXIDINE GLUCONATE 1 APPLICATION(S): 213 SOLUTION TOPICAL at 11:32

## 2024-05-01 RX ADMIN — Medication 1000 MILLIGRAM(S): at 06:40

## 2024-05-01 RX ADMIN — ESCITALOPRAM OXALATE 20 MILLIGRAM(S): 10 TABLET, FILM COATED ORAL at 17:00

## 2024-05-01 RX ADMIN — MIRTAZAPINE 15 MILLIGRAM(S): 45 TABLET, ORALLY DISINTEGRATING ORAL at 21:50

## 2024-05-01 NOTE — PHYSICAL THERAPY INITIAL EVALUATION ADULT - STANDING BALANCE: DYNAMIC, REHAB EVAL
No results found for: HGBA1C    No results for input(s): POCGLU in the last 72 hours  Blood Sugar Average: Last 72 hrs:    · Hold glipizide, last hemoglobin A1c was 5 9 on 7/9/18    Will check a repeat this admission  · Accu-Cheks a c  HS with correctional scale insulin for now  · Start on basal/bolus insulin schedule as needed after review of blood glucose levels w/RW WBAT R LE/poor plus

## 2024-05-01 NOTE — PHYSICAL THERAPY INITIAL EVALUATION ADULT - GENERAL OBSERVATIONS, REHAB EVAL
10:22-11:00 Chart reviewed. Patient available to be seen for physical therapy, denies pain, confirmed with RN.  Pt rec'd in recliner + Primafit, daughter at bedside, pt in NAD.

## 2024-05-01 NOTE — PHYSICAL THERAPY INITIAL EVALUATION ADULT - PERTINENT HX OF CURRENT PROBLEM, REHAB EVAL
83 year old female past medical history of dementia, HTN, HDL, breast CA, dementia, GERD, falls, right hip fracture s/p ORIF in 2022 presented to emergency room for right hip pain status post fall. patient states was walking  in her house at around 11:30pm last night, slipped and fell landed on right side. States it hurts when she moves her right leg, but pain improves with laying still. no head injury no loss of consciousness. patient states after fall was unable to stand and bare weight and called 911. denies fever/chills, CP, abd pain, SOB.

## 2024-05-01 NOTE — PHYSICAL THERAPY INITIAL EVALUATION ADULT - CRITERIA FOR SKILLED THERAPEUTIC INTERVENTIONS
Telephone Encounter by Bea Jara RN, BSN at 03/16/18 02:14 PM     Author:  Bea Jara RN, BSN Service:  (none) Author Type:  Registered Nurse     Filed:  03/16/18 02:16 PM Encounter Date:  3/16/2018 Status:  Signed     :  Bea Jara RN, BSN (Registered Nurse)            Per Dr. Guardado, all of these meds are ok to take.[SS1.1M]       Revision History        User Key Date/Time User Provider Type Action    > SS1.1 03/16/18 02:16 PM Bea Jara RN, BSN Registered Nurse Sign    M - Manual             impairments found/functional limitations in following categories/rehab potential/therapy frequency/predicted duration of therapy intervention/anticipated equipment needs at discharge/anticipated discharge recommendation

## 2024-05-01 NOTE — PHYSICAL THERAPY INITIAL EVALUATION ADULT - ADDITIONAL COMMENTS
Pt reports she lives with  in house with 4 ERIN and then bedroom on 1st floor. Pt says she was independent with amb PTA without device.

## 2024-05-01 NOTE — PHYSICAL THERAPY INITIAL EVALUATION ADULT - WEIGHT-BEARING RESTRICTIONS: GAIT, REHAB EVAL
Gastrointestinal: Negative for abdominal pain, constipation, diarrhea, nausea and vomiting. Endocrine: Negative for cold intolerance and heat intolerance. Genitourinary: Negative for dysuria and hematuria. Musculoskeletal: Positive for arthralgias and gait problem. Negative for back pain and neck pain. Skin: Negative for rash and wound. Neurological: Negative for syncope and weakness. Hematological: Negative for adenopathy. Does not bruise/bleed easily. Psychiatric/Behavioral: Negative for dysphoric mood and sleep disturbance. The patient is not nervous/anxious. Past Medical History:   Diagnosis Date    Chronic back pain     Hypertension     Inflammatory arthritis        Current Outpatient Prescriptions   Medication Sig Dispense Refill    traMADol (ULTRAM) 50 MG tablet Take 1 tablet by mouth 2 times daily as needed for Pain for up to 60 days. . 60 tablet 2    fluticasone (FLONASE) 50 MCG/ACT nasal spray 1 spray by Nasal route daily 1 Bottle 3    celecoxib (CELEBREX) 200 MG capsule Take 1 capsule by mouth daily 30 capsule 2    lisinopril (PRINIVIL;ZESTRIL) 20 MG tablet       dicyclomine (BENTYL) 10 MG capsule Take 10 mg by mouth      furosemide (LASIX) 20 MG tablet Take 1 tablet by mouth daily as needed (swelling) 30 tablet 0     No current facility-administered medications for this visit.         Allergies   Allergen Reactions    Codeine     Hydrocodone-Acetaminophen     Hydromorphone Hcl     Levofloxacin In D5w     Sulfamethoxazole-Trimethoprim        Past Surgical History:   Procedure Laterality Date    ABDOMEN SURGERY      APPENDECTOMY      BACK SURGERY      HAND SURGERY Left     MOUTH SURGERY      TUBAL LIGATION         Social History   Substance Use Topics    Smoking status: Current Every Day Smoker     Packs/day: 0.50     Years: 10.00     Types: Cigarettes    Smokeless tobacco: Never Used    Alcohol use No       Family History   Problem Relation Age of Onset    R LE/weight-bearing as tolerated

## 2024-05-02 ENCOUNTER — TRANSCRIPTION ENCOUNTER (OUTPATIENT)
Age: 84
End: 2024-05-02

## 2024-05-02 LAB
ALBUMIN SERPL ELPH-MCNC: 3.8 G/DL — SIGNIFICANT CHANGE UP (ref 3.5–5.2)
ALP SERPL-CCNC: 76 U/L — SIGNIFICANT CHANGE UP (ref 30–115)
ALT FLD-CCNC: 7 U/L — SIGNIFICANT CHANGE UP (ref 0–41)
ANION GAP SERPL CALC-SCNC: 12 MMOL/L — SIGNIFICANT CHANGE UP (ref 7–14)
AST SERPL-CCNC: 16 U/L — SIGNIFICANT CHANGE UP (ref 0–41)
BASOPHILS # BLD AUTO: 0.01 K/UL — SIGNIFICANT CHANGE UP (ref 0–0.2)
BASOPHILS NFR BLD AUTO: 0.2 % — SIGNIFICANT CHANGE UP (ref 0–1)
BILIRUB SERPL-MCNC: 0.8 MG/DL — SIGNIFICANT CHANGE UP (ref 0.2–1.2)
BUN SERPL-MCNC: 12 MG/DL — SIGNIFICANT CHANGE UP (ref 10–20)
CALCIUM SERPL-MCNC: 8.4 MG/DL — SIGNIFICANT CHANGE UP (ref 8.4–10.5)
CHLORIDE SERPL-SCNC: 104 MMOL/L — SIGNIFICANT CHANGE UP (ref 98–110)
CO2 SERPL-SCNC: 25 MMOL/L — SIGNIFICANT CHANGE UP (ref 17–32)
CREAT SERPL-MCNC: 0.8 MG/DL — SIGNIFICANT CHANGE UP (ref 0.7–1.5)
EGFR: 73 ML/MIN/1.73M2 — SIGNIFICANT CHANGE UP
EOSINOPHIL # BLD AUTO: 0.27 K/UL — SIGNIFICANT CHANGE UP (ref 0–0.7)
EOSINOPHIL NFR BLD AUTO: 5.2 % — SIGNIFICANT CHANGE UP (ref 0–8)
GLUCOSE SERPL-MCNC: 111 MG/DL — HIGH (ref 70–99)
HCT VFR BLD CALC: 31.2 % — LOW (ref 37–47)
HGB BLD-MCNC: 10.6 G/DL — LOW (ref 12–16)
IMM GRANULOCYTES NFR BLD AUTO: 0.2 % — SIGNIFICANT CHANGE UP (ref 0.1–0.3)
LYMPHOCYTES # BLD AUTO: 0.89 K/UL — LOW (ref 1.2–3.4)
LYMPHOCYTES # BLD AUTO: 17.3 % — LOW (ref 20.5–51.1)
MAGNESIUM SERPL-MCNC: 2.2 MG/DL — SIGNIFICANT CHANGE UP (ref 1.8–2.4)
MCHC RBC-ENTMCNC: 30.1 PG — SIGNIFICANT CHANGE UP (ref 27–31)
MCHC RBC-ENTMCNC: 34 G/DL — SIGNIFICANT CHANGE UP (ref 32–37)
MCV RBC AUTO: 88.6 FL — SIGNIFICANT CHANGE UP (ref 81–99)
MONOCYTES # BLD AUTO: 0.41 K/UL — SIGNIFICANT CHANGE UP (ref 0.1–0.6)
MONOCYTES NFR BLD AUTO: 8 % — SIGNIFICANT CHANGE UP (ref 1.7–9.3)
NEUTROPHILS # BLD AUTO: 3.56 K/UL — SIGNIFICANT CHANGE UP (ref 1.4–6.5)
NEUTROPHILS NFR BLD AUTO: 69.1 % — SIGNIFICANT CHANGE UP (ref 42.2–75.2)
NRBC # BLD: 0 /100 WBCS — SIGNIFICANT CHANGE UP (ref 0–0)
PLATELET # BLD AUTO: 93 K/UL — LOW (ref 130–400)
PMV BLD: 11.6 FL — HIGH (ref 7.4–10.4)
POTASSIUM SERPL-MCNC: 3.8 MMOL/L — SIGNIFICANT CHANGE UP (ref 3.5–5)
POTASSIUM SERPL-SCNC: 3.8 MMOL/L — SIGNIFICANT CHANGE UP (ref 3.5–5)
PROT SERPL-MCNC: 5.9 G/DL — LOW (ref 6–8)
RBC # BLD: 3.52 M/UL — LOW (ref 4.2–5.4)
RBC # FLD: 13.3 % — SIGNIFICANT CHANGE UP (ref 11.5–14.5)
SODIUM SERPL-SCNC: 141 MMOL/L — SIGNIFICANT CHANGE UP (ref 135–146)
WBC # BLD: 5.15 K/UL — SIGNIFICANT CHANGE UP (ref 4.8–10.8)
WBC # FLD AUTO: 5.15 K/UL — SIGNIFICANT CHANGE UP (ref 4.8–10.8)

## 2024-05-02 PROCEDURE — 99231 SBSQ HOSP IP/OBS SF/LOW 25: CPT

## 2024-05-02 RX ADMIN — LIDOCAINE 1 PATCH: 4 CREAM TOPICAL at 19:41

## 2024-05-02 RX ADMIN — CHLORHEXIDINE GLUCONATE 1 APPLICATION(S): 213 SOLUTION TOPICAL at 06:12

## 2024-05-02 RX ADMIN — MIRTAZAPINE 15 MILLIGRAM(S): 45 TABLET, ORALLY DISINTEGRATING ORAL at 21:34

## 2024-05-02 RX ADMIN — Medication 1000 MILLIGRAM(S): at 23:40

## 2024-05-02 RX ADMIN — HEPARIN SODIUM 5000 UNIT(S): 5000 INJECTION INTRAVENOUS; SUBCUTANEOUS at 06:12

## 2024-05-02 RX ADMIN — HEPARIN SODIUM 5000 UNIT(S): 5000 INJECTION INTRAVENOUS; SUBCUTANEOUS at 21:34

## 2024-05-02 RX ADMIN — Medication 1000 MILLIGRAM(S): at 06:12

## 2024-05-02 RX ADMIN — PANTOPRAZOLE SODIUM 40 MILLIGRAM(S): 20 TABLET, DELAYED RELEASE ORAL at 06:12

## 2024-05-02 RX ADMIN — LIDOCAINE 1 PATCH: 4 CREAM TOPICAL at 16:17

## 2024-05-02 NOTE — DISCHARGE NOTE PROVIDER - NSDCCPCAREPLAN_GEN_ALL_CORE_FT
PRINCIPAL DISCHARGE DIAGNOSIS  Diagnosis: Pelvic fracture  Assessment and Plan of Treatment: You were admitted to internal medicine after fall resulting in acute right superior and inferior pubic rami fractures.  You were monitored inpatient for acute changes in status.  Orthopedics was consulted and did not recommend any acute intervention; continue pain medications and PT. PT consulted and recomend outpatient treatment  Discharge to subacute rehabilitation   Follow up with orthopedics outpatient to discuss progress      SECONDARY DISCHARGE DIAGNOSES  Diagnosis: Hypertension  Assessment and Plan of Treatment: High BP recordings read throughout admission.  Hydrochlorothiazide 12.5mg PO initiated while admitted to stabilize BP.  Continue outpatient, monitor BP at home.  F/U with PMD outpatient

## 2024-05-02 NOTE — DISCHARGE NOTE PROVIDER - PROVIDER TOKENS
PROVIDER:[TOKEN:[[id number]],FOLLOWUP:[1 week]],PROVIDER:[TOKEN:[[id number]],FOLLOWUP:[1 week]]

## 2024-05-02 NOTE — PROGRESS NOTE ADULT - ASSESSMENT
83 year old female past medical history of dementia, HTN, HDL, breast CA, dementia, GERD, falls, right hip fracture s/p ORIF in 2022 presented to emergency room for right hip pain status post fall.     # R Pelvic fractures  # Fall at home  - CT showing + Fx  - PT eval done - recommend MICHELLE   - pain control  - ortho consult appreciated     # HTN  -started HCTZ 12.5mg  - monitor pressure    # GERD  - c/w protonix    Family: daughter updated at bedside 5/2  Dispo: medically stable for dc  Pending: MICHELLE placement   
83 year old female past medical history of dementia, HTN, HDL, breast CA, dementia, GERD, falls, right hip fracture s/p ORIF in 2022 presented to emergency room for right hip pain status post fall.     Assessment    ·	Comminuted pelvic fracture secondary to mechanical fall  ·	Hx of right hip fracture s/p ORIF in 2022  ·	Dementia  ·	Breast CA  ·	HTN  ·	HLD    Plan    - will do standing tylenol / tramadol low dose prn / lidocaine patch, PT / ortho recs appreciated  - c/w mirtazapine / lexapro    Pending: PT    # DVT PPX: heparin sc
83 year old female past medical history of dementia, HTN, HDL, breast CA, dementia, GERD, falls, right hip fracture s/p ORIF in 2022 presented to emergency room for right hip pain status post fall.     # R Pelvic fractures  # Fall at home  - CT showing + Fx  - PT eval done - recommend MICHELLE   - pain control  - ortho consult apprecaited     # HTN  -started HCTZ 12.5mg  - monitor pressure    # GERD  - c/w protonix    Family: daughter updated at bedside 5/1  Dispo: medically stable for dc  Pending: MICHELLE placement

## 2024-05-02 NOTE — DISCHARGE NOTE PROVIDER - CARE PROVIDER_API CALL
Nic Samuel  [facility]  [address] [place] [address]  Phone: [phone]  Fax: [phone]  Follow Up Time: 1 week    Harshal Lozano  Orthopaedic Surgery  [address] [place] [address]  Phone: [phone]  Fax: [phone]  Follow Up Time: 1 week

## 2024-05-02 NOTE — DISCHARGE NOTE PROVIDER - HOSPITAL COURSE
83 year old female past medical history of dementia, HTN, HDL, breast CA, dementia, GERD, falls, right hip fracture s/p ORIF in 2022 presented to emergency room for right hip pain status post fall.  Patient admitted to medical floor for routine monitoring, labs, imaging, and consultations     # R Pelvic fractures  # Fall at home  - CT pelvic + for acute right superior and inferior pubic rami fractures   - Ortho consulted: recommend nonoperative management, pain control, outpatient F/U  - PT eval done - impairments found in endurance, gait, locomotion; recommend MICHELLE   - pain control    # HTN  -started HCTZ 12.5mg PO daily, continue outpatient   - monitor pressure    # GERD  - c/w protonix 83 year old female past medical history of dementia, HTN, HDL, breast CA, dementia, GERD, falls, right hip fracture s/p ORIF in 2022 presented to emergency room for right hip pain status post fall.  Patient admitted to medical floor for routine monitoring, labs, imaging, and consultations     # R Pelvic fractures  # Fall at home  - CT pelvic + for acute right superior and inferior pubic rami fractures   - Ortho consulted: recommend nonoperative management, pain control, outpatient F/U  - PT eval done - impairments found in endurance, gait, locomotion; recommend MICHELLE   - pain control    # HTN  -started HCTZ 12.5mg PO daily, continue outpatient   - monitor pressure    # GERD  - c/w protonix  Patient discharged to subacute rehab - [facility]

## 2024-05-02 NOTE — DISCHARGE NOTE PROVIDER - ATTENDING DISCHARGE PHYSICAL EXAMINATION:
Vital Signs Last 24 Hrs  T(F): 98.3 (03 May 2024 05:00), Max: 98.3 (02 May 2024 19:10)  HR: 95 (03 May 2024 05:00) (74 - 95)  BP: 136/66 (03 May 2024 05:00) (134/72 - 142/65)  RR: 18 (03 May 2024 05:00) (18 - 18)  SpO2: 96% (03 May 2024 05:00) (95% - 96%)    PHYSICAL EXAM:  GENERAL: NAD, well-groomed, well-developed  HEAD:  Atraumatic, Normocephalic  EYES: EOMI, conjunctiva and sclera clear  ENMT: Moist mucous membranes, Good dentition, no thrush  NECK: Supple, No JVD  CHEST/LUNG: Clear to auscultation bilaterally, good air entry, non-labored breathing  HEART: RRR; S1/S2, 2/6 systolicmurmur   ABDOMEN: Soft, Nontender, Nondistended; Bowel sounds present  VASCULAR: Normal pulses, Normal capillary refill  EXTREMITIES: No calf tenderness, No cyanosis, No edema  LYMPH: Normal; No lymphadenopathy noted  SKIN: Warm, Intact  PSYCH: Normal mood, Normal affect  NERVOUS SYSTEM:  A/O x2, poor concentration

## 2024-05-02 NOTE — DISCHARGE NOTE PROVIDER - ATTENDING ATTESTATION STATEMENT
Altered mental status I have personally seen and examined the patient. I have collaborated with and supervised the

## 2024-05-02 NOTE — DISCHARGE NOTE PROVIDER - NSDCMRMEDTOKEN_GEN_ALL_CORE_FT
escitalopram 20 mg oral tablet: 1 tab(s) orally once a day  mirtazapine 15 mg oral tablet: 1 tab(s) orally once a day (at bedtime)  pantoprazole 40 mg oral delayed release tablet: 1 tab(s) orally once a day   acetaminophen 500 mg oral tablet: 2 tab(s) orally every 8 hours  escitalopram 20 mg oral tablet: 1 tab(s) orally once a day  hydroCHLOROthiazide 12.5 mg oral capsule: 1 cap(s) orally once a day  lidocaine 4% topical film: Apply topically to affected area once a day  mirtazapine 15 mg oral tablet: 1 tab(s) orally once a day (at bedtime)  pantoprazole 40 mg oral delayed release tablet: 1 tab(s) orally once a day  traMADol 50 mg oral tablet: 0.5 tab(s) orally every 8 hours As needed Moderate Pain (4 - 6)

## 2024-05-02 NOTE — PROGRESS NOTE ADULT - SUBJECTIVE AND OBJECTIVE BOX
SUBJECTIVE:    Patient is a 83y old Female who presents with a chief complaint of     HPI:  83 year old female past medical history of dementia, HTN, HDL, breast CA, dementia, GERD, falls, right hip fracture s/p ORIF in 2022 presented to emergency room for right hip pain status post fall. patient states was walking  in her house at around 11:30pm last night, slipped and fell landed on right side. States it hurts when she moves her right leg, but pain improves with laying still. no head injury no loss of consciousness. patient states after fall was unable to stand and bare weight and called 911. denies fever/chills, CP, abd pain, SOB.  (30 Apr 2024 05:16)      Currently admitted to medicine with the primary diagnosis of Pelvic fracture    having some pain still no other medical complaints    Besides the pertinent positives and negatives described above, the ROS was within normal limits.    PAST MEDICAL & SURGICAL HISTORY  Dementia    GERD (gastroesophageal reflux disease)    Breast cancer    Leaky heart valve    S/P hysterectomy    S/P left mastectomy      SOCIAL HISTORY:    ALLERGIES:  penicillin (Unknown)    MEDICATIONS:  STANDING MEDICATIONS  escitalopram 20 milliGRAM(s) Oral daily  heparin   Injectable 5000 Unit(s) SubCutaneous every 8 hours  mirtazapine 15 milliGRAM(s) Oral at bedtime  pantoprazole    Tablet 40 milliGRAM(s) Oral before breakfast  potassium chloride    Tablet ER 20 milliEquivalent(s) Oral once    PRN MEDICATIONS  acetaminophen     Tablet .. 650 milliGRAM(s) Oral every 6 hours PRN  aluminum hydroxide/magnesium hydroxide/simethicone Suspension 30 milliLiter(s) Oral every 4 hours PRN  ondansetron Injectable 4 milliGRAM(s) IV Push every 8 hours PRN    VITALS:   T(F): 98.3  HR: 67  BP: 134/63  RR: 18  SpO2: 94%    LABS:                        10.7   7.74  )-----------( 127      ( 30 Apr 2024 01:47 )             31.4     04-30    137  |  103  |  18  ----------------------------<  96  3.4<L>   |  26  |  1.0    Ca    9.1      30 Apr 2024 01:47    TPro  6.1  /  Alb  4.2  /  TBili  0.3  /  DBili  x   /  AST  20  /  ALT  10  /  AlkPhos  78  04-30      Urinalysis Basic - ( 30 Apr 2024 01:47 )    Color: x / Appearance: x / SG: x / pH: x  Gluc: 96 mg/dL / Ketone: x  / Bili: x / Urobili: x   Blood: x / Protein: x / Nitrite: x   Leuk Esterase: x / RBC: x / WBC x   Sq Epi: x / Non Sq Epi: x / Bacteria: x                Pelvic fracture      RADIOLOGY:    PHYSICAL EXAM:  General: WN/WD NAD  Neurology: A&Ox2, nonfocal, BOB x 4  Head:  Normocephalic, atraumatic  ENT:  Mucosa moist, no ulcerations  Neck:  Supple, no sinuses or palpable masses  Lymphatic:  No palpable cervical, supraclavicular, axillary or inguinal adenopathy  Respiratory: CTA B/L  CV: RRR, S1S2, no murmur  Abdominal: Soft, NT, ND no palpable mass  MSK: No edema, + peripheral pulses  Incisions: intact, no erythema or drainage    Intravenous access: yes  NG tube: no  Rivera Catheter: no       
Patient is a 83y old  Female who presents with a chief complaint of R Pelvic Fracture, frequent falls  (02 May 2024 10:46)      Patient seen and examined at bedside.  patient denies any chest pain or shortness of breath   ALLERGIES:  penicillin (Unknown)    MEDICATIONS:  acetaminophen     Tablet .. 1000 milliGRAM(s) Oral every 8 hours  aluminum hydroxide/magnesium hydroxide/simethicone Suspension 30 milliLiter(s) Oral every 4 hours PRN  chlorhexidine 2% Cloths 1 Application(s) Topical <User Schedule>  escitalopram 20 milliGRAM(s) Oral daily  heparin   Injectable 5000 Unit(s) SubCutaneous every 8 hours  hydrochlorothiazide 12.5 milliGRAM(s) Oral daily  lidocaine   4% Patch 1 Patch Transdermal every 24 hours  mirtazapine 15 milliGRAM(s) Oral at bedtime  ondansetron Injectable 4 milliGRAM(s) IV Push every 8 hours PRN  pantoprazole    Tablet 40 milliGRAM(s) Oral before breakfast  traMADol 25 milliGRAM(s) Oral every 8 hours PRN    Vital Signs Last 24 Hrs  T(F): 97 (02 May 2024 12:28), Max: 97.9 (01 May 2024 19:40)  HR: 74 (02 May 2024 15:39) (74 - 100)  BP: 142/65 (02 May 2024 15:39) (119/70 - 154/74)  RR: 18 (02 May 2024 12:28) (18 - 18)  SpO2: 95% (02 May 2024 12:28) (95% - 95%)  I&O's Summary      PHYSICAL EXAM:  General: NAD, A/O x 1  ENT: MMM  Neck: Supple, No JVD  Lungs: Clear to auscultation bilaterally  Cardio: RRR, S1/S2, 2/6 systolic murmur   Abdomen: Soft, Nontender, Nondistended; Bowel sounds present  Extremities: No cyanosis, No edema    LABS:                        10.6   5.15  )-----------( 93       ( 02 May 2024 05:51 )             31.2     05-02    141  |  104  |  12  ----------------------------<  111  3.8   |  25  |  0.8    Ca    8.4      02 May 2024 05:51  Mg     2.2     05-02    TPro  5.9  /  Alb  3.8  /  TBili  0.8  /  DBili  x   /  AST  16  /  ALT  7   /  AlkPhos  76  05-02                                Urinalysis Basic - ( 02 May 2024 05:51 )    Color: x / Appearance: x / SG: x / pH: x  Gluc: 111 mg/dL / Ketone: x  / Bili: x / Urobili: x   Blood: x / Protein: x / Nitrite: x   Leuk Esterase: x / RBC: x / WBC x   Sq Epi: x / Non Sq Epi: x / Bacteria: x            RADIOLOGY & ADDITIONAL TESTS:    Care Discussed with Consultants/Other Providers: 
Patient is a 83y old  Female who presents with a chief complaint of     Patient seen and examined at bedside.  Patient denies any chest pain or shortness of breath   ALLERGIES:  penicillin (Unknown)    MEDICATIONS:  acetaminophen     Tablet .. 1000 milliGRAM(s) Oral every 8 hours  aluminum hydroxide/magnesium hydroxide/simethicone Suspension 30 milliLiter(s) Oral every 4 hours PRN  chlorhexidine 2% Cloths 1 Application(s) Topical <User Schedule>  escitalopram 20 milliGRAM(s) Oral daily  heparin   Injectable 5000 Unit(s) SubCutaneous every 8 hours  hydrochlorothiazide 12.5 milliGRAM(s) Oral daily  lidocaine   4% Patch 1 Patch Transdermal every 24 hours  mirtazapine 15 milliGRAM(s) Oral at bedtime  ondansetron Injectable 4 milliGRAM(s) IV Push every 8 hours PRN  pantoprazole    Tablet 40 milliGRAM(s) Oral before breakfast  potassium chloride   Powder 40 milliEquivalent(s) Oral every 4 hours  traMADol 25 milliGRAM(s) Oral every 8 hours PRN    Vital Signs Last 24 Hrs  T(F): 97.5 (01 May 2024 12:36), Max: 98.6 (30 Apr 2024 22:00)  HR: 78 (01 May 2024 12:36) (75 - 78)  BP: 163/79 (01 May 2024 12:36) (155/70 - 167/76)  RR: 16 (01 May 2024 12:36) (16 - 18)  SpO2: 95% (01 May 2024 12:36) (95% - 95%)  I&O's Summary    30 Apr 2024 07:01  -  01 May 2024 07:00  --------------------------------------------------------  IN: 0 mL / OUT: 800 mL / NET: -800 mL        PHYSICAL EXAM:  General: NAD, A/O x 1  ENT: MMM  Neck: Supple, No JVD  Lungs: Clear to auscultation bilaterally  Cardio: RRR, S1/S2, 2/6 systolic murmur   Abdomen: Soft, Nontender, Nondistended; Bowel sounds present  Extremities: No cyanosis, No edema    LABS:                        10.7   5.35  )-----------( 95       ( 01 May 2024 07:18 )             31.1     05-01    144  |  106  |  10  ----------------------------<  116  3.1   |  23  |  0.8    Ca    8.6      01 May 2024 07:18  Mg     1.7     05-01    TPro  6.0  /  Alb  4.0  /  TBili  0.9  /  DBili  x   /  AST  16  /  ALT  7   /  AlkPhos  73  05-01                                Urinalysis Basic - ( 01 May 2024 07:18 )    Color: x / Appearance: x / SG: x / pH: x  Gluc: 116 mg/dL / Ketone: x  / Bili: x / Urobili: x   Blood: x / Protein: x / Nitrite: x   Leuk Esterase: x / RBC: x / WBC x   Sq Epi: x / Non Sq Epi: x / Bacteria: x            RADIOLOGY & ADDITIONAL TESTS:    Care Discussed with Consultants/Other Providers:

## 2024-05-03 ENCOUNTER — TRANSCRIPTION ENCOUNTER (OUTPATIENT)
Age: 84
End: 2024-05-03

## 2024-05-03 VITALS
TEMPERATURE: 99 F | RESPIRATION RATE: 18 BRPM | OXYGEN SATURATION: 95 % | HEART RATE: 103 BPM | DIASTOLIC BLOOD PRESSURE: 58 MMHG | SYSTOLIC BLOOD PRESSURE: 120 MMHG

## 2024-05-03 PROCEDURE — 99239 HOSP IP/OBS DSCHRG MGMT >30: CPT

## 2024-05-03 RX ORDER — ACETAMINOPHEN 500 MG
2 TABLET ORAL
Qty: 0 | Refills: 0 | DISCHARGE
Start: 2024-05-03

## 2024-05-03 RX ORDER — HYDROCHLOROTHIAZIDE 25 MG
1 TABLET ORAL
Qty: 0 | Refills: 0 | DISCHARGE
Start: 2024-05-03

## 2024-05-03 RX ORDER — TRAMADOL HYDROCHLORIDE 50 MG/1
0.5 TABLET ORAL
Qty: 0 | Refills: 0 | DISCHARGE
Start: 2024-05-03

## 2024-05-03 RX ORDER — LIDOCAINE 4 G/100G
1 CREAM TOPICAL
Qty: 0 | Refills: 0 | DISCHARGE
Start: 2024-05-03

## 2024-05-03 RX ADMIN — Medication 1000 MILLIGRAM(S): at 17:37

## 2024-05-03 RX ADMIN — Medication 1000 MILLIGRAM(S): at 15:18

## 2024-05-03 RX ADMIN — Medication 1000 MILLIGRAM(S): at 00:10

## 2024-05-03 RX ADMIN — HEPARIN SODIUM 5000 UNIT(S): 5000 INJECTION INTRAVENOUS; SUBCUTANEOUS at 15:17

## 2024-05-03 RX ADMIN — LIDOCAINE 1 PATCH: 4 CREAM TOPICAL at 15:22

## 2024-05-03 RX ADMIN — ESCITALOPRAM OXALATE 20 MILLIGRAM(S): 10 TABLET, FILM COATED ORAL at 11:39

## 2024-05-03 NOTE — DISCHARGE NOTE NURSING/CASE MANAGEMENT/SOCIAL WORK - PATIENT PORTAL LINK FT
You can access the FollowMyHealth Patient Portal offered by [facility] by registering at the following website: [email or web address]. By joining [employer]’s FollowMyHealth portal, you will also be able to view your health information using other applications (apps) compatible with our system.

## 2024-05-03 NOTE — DISCHARGE NOTE NURSING/CASE MANAGEMENT/SOCIAL WORK - NSDCPEFALRISK_GEN_ALL_CORE
For information on Fall & Injury Prevention, visit: [email or web address] OR  [email or web address] OR  https://www.cdc.gov/steadi/patient.html

## 2024-05-08 DIAGNOSIS — F03.90 UNSPECIFIED DEMENTIA WITHOUT BEHAVIORAL DISTURBANCE: ICD-10-CM
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
## 2024-05-08 DIAGNOSIS — E78.5 HYPERLIPIDEMIA, UNSPECIFIED: ICD-10-CM

## 2024-05-08 DIAGNOSIS — Z88.0 ALLERGY STATUS TO PENICILLIN: ICD-10-CM

## 2024-05-08 DIAGNOSIS — K21.9 GASTRO-ESOPHAGEAL REFLUX DISEASE WITHOUT ESOPHAGITIS: ICD-10-CM

## 2024-05-08 DIAGNOSIS — S32.511A FRACTURE OF SUPERIOR RIM OF RIGHT PUBIS, INITIAL ENCOUNTER FOR CLOSED FRACTURE: ICD-10-CM

## 2024-05-08 DIAGNOSIS — Z90.710 ACQUIRED ABSENCE OF BOTH CERVIX AND UTERUS: ICD-10-CM
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
## 2024-05-08 DIAGNOSIS — M25.551 PAIN IN RIGHT HIP: ICD-10-CM

## 2024-05-08 DIAGNOSIS — E87.6 HYPOKALEMIA: ICD-10-CM

## 2024-05-08 DIAGNOSIS — Z85.3 PERSONAL HISTORY OF MALIGNANT NEOPLASM OF BREAST: ICD-10-CM

## 2024-05-08 DIAGNOSIS — Z90.12 ACQUIRED ABSENCE OF LEFT BREAST AND NIPPLE: ICD-10-CM

## 2024-06-25 ENCOUNTER — APPOINTMENT (OUTPATIENT)
Dept: ORTHOPEDIC SURGERY | Facility: CLINIC | Age: 84
End: 2024-06-25

## 2024-07-16 ENCOUNTER — APPOINTMENT (OUTPATIENT)
Dept: ORTHOPEDIC SURGERY | Facility: CLINIC | Age: 84
End: 2024-07-16

## 2025-01-08 ENCOUNTER — EMERGENCY (EMERGENCY)
Facility: HOSPITAL | Age: 85
LOS: 0 days | Discharge: HOME | End: 2025-01-08
Attending: EMERGENCY MEDICINE
Payer: MEDICARE

## 2025-01-08 VITALS
DIASTOLIC BLOOD PRESSURE: 83 MMHG | OXYGEN SATURATION: 99 % | RESPIRATION RATE: 18 BRPM | HEIGHT: 62 IN | HEART RATE: 79 BPM | SYSTOLIC BLOOD PRESSURE: 138 MMHG | WEIGHT: 110.01 LBS | TEMPERATURE: 99 F

## 2025-01-08 VITALS
HEART RATE: 80 BPM | RESPIRATION RATE: 18 BRPM | DIASTOLIC BLOOD PRESSURE: 76 MMHG | TEMPERATURE: 98 F | SYSTOLIC BLOOD PRESSURE: 128 MMHG | OXYGEN SATURATION: 99 %

## 2025-01-08 DIAGNOSIS — Z90.12 ACQUIRED ABSENCE OF LEFT BREAST AND NIPPLE: Chronic | ICD-10-CM

## 2025-01-08 DIAGNOSIS — Z90.710 ACQUIRED ABSENCE OF BOTH CERVIX AND UTERUS: Chronic | ICD-10-CM

## 2025-01-08 LAB
ALBUMIN SERPL ELPH-MCNC: 4.5 G/DL — SIGNIFICANT CHANGE UP (ref 3.5–5.2)
ALP SERPL-CCNC: 74 U/L — SIGNIFICANT CHANGE UP (ref 30–115)
ALT FLD-CCNC: 6 U/L — SIGNIFICANT CHANGE UP (ref 0–41)
ANION GAP SERPL CALC-SCNC: 10 MMOL/L — SIGNIFICANT CHANGE UP (ref 7–14)
APPEARANCE UR: CLEAR — SIGNIFICANT CHANGE UP
AST SERPL-CCNC: 17 U/L — SIGNIFICANT CHANGE UP (ref 0–41)
BASOPHILS # BLD AUTO: 0.02 K/UL — SIGNIFICANT CHANGE UP (ref 0–0.2)
BASOPHILS NFR BLD AUTO: 0.3 % — SIGNIFICANT CHANGE UP (ref 0–1)
BILIRUB SERPL-MCNC: 0.5 MG/DL — SIGNIFICANT CHANGE UP (ref 0.2–1.2)
BILIRUB UR-MCNC: NEGATIVE — SIGNIFICANT CHANGE UP
BUN SERPL-MCNC: 17 MG/DL — SIGNIFICANT CHANGE UP (ref 10–20)
CALCIUM SERPL-MCNC: 9.6 MG/DL — SIGNIFICANT CHANGE UP (ref 8.4–10.5)
CHLORIDE SERPL-SCNC: 104 MMOL/L — SIGNIFICANT CHANGE UP (ref 98–110)
CO2 SERPL-SCNC: 27 MMOL/L — SIGNIFICANT CHANGE UP (ref 17–32)
COLOR SPEC: YELLOW — SIGNIFICANT CHANGE UP
CREAT SERPL-MCNC: 1.2 MG/DL — SIGNIFICANT CHANGE UP (ref 0.7–1.5)
DIFF PNL FLD: NEGATIVE — SIGNIFICANT CHANGE UP
EGFR: 45 ML/MIN/1.73M2 — LOW
EOSINOPHIL # BLD AUTO: 0.09 K/UL — SIGNIFICANT CHANGE UP (ref 0–0.7)
EOSINOPHIL NFR BLD AUTO: 1.5 % — SIGNIFICANT CHANGE UP (ref 0–8)
GLUCOSE SERPL-MCNC: 104 MG/DL — HIGH (ref 70–99)
GLUCOSE UR QL: NEGATIVE MG/DL — SIGNIFICANT CHANGE UP
HCT VFR BLD CALC: 36.2 % — LOW (ref 37–47)
HGB BLD-MCNC: 12.5 G/DL — SIGNIFICANT CHANGE UP (ref 12–16)
IMM GRANULOCYTES NFR BLD AUTO: 0.3 % — SIGNIFICANT CHANGE UP (ref 0.1–0.3)
KETONES UR-MCNC: ABNORMAL MG/DL
LEUKOCYTE ESTERASE UR-ACNC: NEGATIVE — SIGNIFICANT CHANGE UP
LYMPHOCYTES # BLD AUTO: 1.08 K/UL — LOW (ref 1.2–3.4)
LYMPHOCYTES # BLD AUTO: 17.8 % — LOW (ref 20.5–51.1)
MCHC RBC-ENTMCNC: 30.3 PG — SIGNIFICANT CHANGE UP (ref 27–31)
MCHC RBC-ENTMCNC: 34.5 G/DL — SIGNIFICANT CHANGE UP (ref 32–37)
MCV RBC AUTO: 87.7 FL — SIGNIFICANT CHANGE UP (ref 81–99)
MONOCYTES # BLD AUTO: 0.36 K/UL — SIGNIFICANT CHANGE UP (ref 0.1–0.6)
MONOCYTES NFR BLD AUTO: 5.9 % — SIGNIFICANT CHANGE UP (ref 1.7–9.3)
NEUTROPHILS # BLD AUTO: 4.49 K/UL — SIGNIFICANT CHANGE UP (ref 1.4–6.5)
NEUTROPHILS NFR BLD AUTO: 74.2 % — SIGNIFICANT CHANGE UP (ref 42.2–75.2)
NITRITE UR-MCNC: NEGATIVE — SIGNIFICANT CHANGE UP
NRBC # BLD: 0 /100 WBCS — SIGNIFICANT CHANGE UP (ref 0–0)
PH UR: 5.5 — SIGNIFICANT CHANGE UP (ref 5–8)
PLATELET # BLD AUTO: 140 K/UL — SIGNIFICANT CHANGE UP (ref 130–400)
PMV BLD: 9.9 FL — SIGNIFICANT CHANGE UP (ref 7.4–10.4)
POTASSIUM SERPL-MCNC: 3.6 MMOL/L — SIGNIFICANT CHANGE UP (ref 3.5–5)
POTASSIUM SERPL-SCNC: 3.6 MMOL/L — SIGNIFICANT CHANGE UP (ref 3.5–5)
PROT SERPL-MCNC: 6.5 G/DL — SIGNIFICANT CHANGE UP (ref 6–8)
PROT UR-MCNC: NEGATIVE MG/DL — SIGNIFICANT CHANGE UP
RBC # BLD: 4.13 M/UL — LOW (ref 4.2–5.4)
RBC # FLD: 13.4 % — SIGNIFICANT CHANGE UP (ref 11.5–14.5)
SODIUM SERPL-SCNC: 141 MMOL/L — SIGNIFICANT CHANGE UP (ref 135–146)
SP GR SPEC: 1.02 — SIGNIFICANT CHANGE UP (ref 1–1.03)
UROBILINOGEN FLD QL: 0.2 MG/DL — SIGNIFICANT CHANGE UP (ref 0.2–1)
WBC # BLD: 6.06 K/UL — SIGNIFICANT CHANGE UP (ref 4.8–10.8)
WBC # FLD AUTO: 6.06 K/UL — SIGNIFICANT CHANGE UP (ref 4.8–10.8)

## 2025-01-08 PROCEDURE — 70450 CT HEAD/BRAIN W/O DYE: CPT | Mod: MC

## 2025-01-08 PROCEDURE — 71045 X-RAY EXAM CHEST 1 VIEW: CPT

## 2025-01-08 PROCEDURE — 99284 EMERGENCY DEPT VISIT MOD MDM: CPT | Mod: FS

## 2025-01-08 PROCEDURE — 99284 EMERGENCY DEPT VISIT MOD MDM: CPT | Mod: 25

## 2025-01-08 PROCEDURE — 36000 PLACE NEEDLE IN VEIN: CPT

## 2025-01-08 PROCEDURE — 85025 COMPLETE CBC W/AUTO DIFF WBC: CPT

## 2025-01-08 PROCEDURE — 70486 CT MAXILLOFACIAL W/O DYE: CPT | Mod: MC

## 2025-01-08 PROCEDURE — 71045 X-RAY EXAM CHEST 1 VIEW: CPT | Mod: 26

## 2025-01-08 PROCEDURE — 81003 URINALYSIS AUTO W/O SCOPE: CPT

## 2025-01-08 PROCEDURE — 36415 COLL VENOUS BLD VENIPUNCTURE: CPT

## 2025-01-08 PROCEDURE — 70486 CT MAXILLOFACIAL W/O DYE: CPT | Mod: 26,MC

## 2025-01-08 PROCEDURE — 70450 CT HEAD/BRAIN W/O DYE: CPT | Mod: 26,MC

## 2025-01-08 PROCEDURE — 80053 COMPREHEN METABOLIC PANEL: CPT

## 2025-01-08 RX ORDER — SODIUM CHLORIDE 9 MG/ML
1000 INJECTION, SOLUTION INTRAMUSCULAR; INTRAVENOUS; SUBCUTANEOUS ONCE
Refills: 0 | Status: COMPLETED | OUTPATIENT
Start: 2025-01-08 | End: 2025-01-08

## 2025-01-08 RX ADMIN — SODIUM CHLORIDE 1000 MILLILITER(S): 9 INJECTION, SOLUTION INTRAMUSCULAR; INTRAVENOUS; SUBCUTANEOUS at 17:17

## 2025-01-08 NOTE — ED PROVIDER NOTE - ATTENDING APP SHARED VISIT CONTRIBUTION OF CARE
Patient with a history of dementia brought in by family for wandering behavior, 1 to make sure she does not have an infection, on exam pleasantly demented woman in no distress, will check labs and imaging

## 2025-01-08 NOTE — ED PROVIDER NOTE - PATIENT PORTAL LINK FT
You can access the FollowMyHealth Patient Portal offered by [facility] by registering at the following website: [email or web address]. By joining [employer]’s FollowMyHealth portal, you will also be able to view your health information using other applications (apps) compatible with our system.

## 2025-01-08 NOTE — ED ADULT NURSE NOTE - NSFALLRISKFACTORS_ED_ALL_ED
No indicators present
How Severe Are Your Spot(S)?: mild
What Is The Reason For Today's Visit?: Full Body Skin Examination
What Is The Reason For Today's Visit? (Being Monitored For X): concerning skin lesions on an annual basis

## 2025-01-08 NOTE — ED PROVIDER NOTE - CLINICAL SUMMARY MEDICAL DECISION MAKING FREE TEXT BOX
patient with dementia with behavior change from baseline, family wants to r/o reversible cause, exam as above, labs and studies reviewed, pt calm and cooperative in ED, will dc to family.

## 2025-01-08 NOTE — ED PROVIDER NOTE - NSFOLLOWUPINSTRUCTIONS_ED_ALL_ED_FT
Please follow up with your primary care physician within 24-72 hours and return immediately if symptoms worsen.    Dementia    WHAT YOU NEED TO KNOW:    Dementia is a condition that causes loss of memory, thought control, and judgment. Alzheimer disease is the most common cause of dementia. Other common causes are loss of blood flow or nerve damage in the brain, and long-term alcohol or drug use. Dementia cannot be cured or prevented, but treatment may slow or reduce your symptoms.     DISCHARGE INSTRUCTIONS:    Return to the emergency department if you or someone close to you notices:     You have signs of delirium, such as extreme confusion, and seeing or hearing things that are not there.      You become angry or violent, and cannot be calmed down.      You faint and cannot be woken.     Contact your healthcare provider if you or someone close to you notices:     You have a fever.      You have increased confusion, behavior, or mood changes.      You have questions or concerns about your condition or care.    Medicines: You may need any of the following:     Antianxiety medicine may be used to help reduce anxiety and keep you calm.      Antipsychotics may be used to help control any anger or violence.      Antidepressants may be used to help improve your mood and reduce your symptoms of depression.      Take your medicine as directed. Contact your healthcare provider if you think your medicine is not helping or if you have side effects. Tell him of her if you are allergic to any medicine. Keep a list of the medicines, vitamins, and herbs you take. Include the amounts, and when and why you take them. Bring the list or the pill bottles to follow-up visits. Carry your medicine list with you in case of an emergency.    Follow up with your healthcare provider as directed: Write down your questions so you remember to ask them during your visits. Ask someone to go in with you if you have trouble understanding or remembering what your healthcare provider tells you. The person can take notes for you during the visit and go over the notes with you later.     Manage your dementia: You may begin to need an in-home aide to help you remember your daily tasks. Ask your healthcare provider for a list of organizations that can help. It is best to arrange for help while you are thinking clearly. The following may also help you manage your dementia:     Keep your mind and body active. Do activities that you love, such as art, gardening, or listening to music. Call or visit people often. This will keep your social skills sharp, and may help reduce depression.      Take all of your medicines as directed. This will help control medical conditions, such as high blood pressure, high cholesterol, and diabetes.      Write daily schedules and routines. Record doctor appointments, times to take your medicines, meal times, or any other things to remember. Write down reminders to use the bathroom if you have trouble remembering. You may to ask someone to write things down for you.       Place clocks and calendars where you can see them. This will help you remember appointments and tasks.      Do not smoke. Nicotine and other chemicals in cigarettes and cigars can cause lung damage. Ask your healthcare provider for information if you currently smoke and need help to quit. E-cigarettes or smokeless tobacco still contain nicotine. Talk to your healthcare provider before you use these products.       Eat healthy foods. Examples are fruits, vegetables, whole-grain breads, low-fat dairy, beans, lean meats, and fish. Ask if you need to be on a special diet.         © Copyright [employer] 2019 All illustrations and images included in CareNotes are the copyrighted property of [employer] or [employer].

## 2025-01-08 NOTE — ED PROVIDER NOTE - OBJECTIVE STATEMENT
84-year-old female with a past medical history of hypertension, hyperlipidemia, GERD, dementia presents for confusion.  Patient accompanied by daughter who states today pt ran out of her house and refused to come back inside.  Daughter states patient later calmed down and does not recall the episode. denies any other symptoms including fevers, chill, headache, recent illness/travel, cough, abdominal pain, chest pain, or SOB.

## 2025-01-08 NOTE — ED PROVIDER NOTE - PHYSICAL EXAMINATION
Gen: NAD  Head: NCAT  HEENT: PERRL, oral mucosa moist, normal conjunctiva, oropharynx clear without exudate or erythema  Lung: CTAB, no respiratory distress, no wheezing, rales, rhonchi  CV: normal s1/s2, rrr, Normal perfusion, pulses 2+ throughout  Abd: soft, NTND, no CVA tenderness  Genitourinary: no pelvic tenderness  MSK: No edema, no visible deformities, full range of motion in all 4 extremities  Neuro: CN II-XII grossly intact, No focal neurologic deficits, coordination/sensation intact, strength 5/5 BUE/BLE   Skin: No rash   Psych: normal affect
